# Patient Record
Sex: FEMALE | Race: WHITE | NOT HISPANIC OR LATINO | Employment: PART TIME | URBAN - NONMETROPOLITAN AREA
[De-identification: names, ages, dates, MRNs, and addresses within clinical notes are randomized per-mention and may not be internally consistent; named-entity substitution may affect disease eponyms.]

---

## 2017-11-25 ENCOUNTER — HOSPITAL ENCOUNTER (EMERGENCY)
Facility: HOSPITAL | Age: 42
Discharge: HOME | End: 2017-11-26
Attending: EMERGENCY MEDICINE | Admitting: EMERGENCY MEDICINE

## 2017-11-25 ENCOUNTER — APPOINTMENT (OUTPATIENT)
Dept: CT IMAGING | Facility: HOSPITAL | Age: 42
End: 2017-11-25

## 2017-11-25 DIAGNOSIS — N20.1 URETEROLITHIASIS: Primary | ICD-10-CM

## 2017-11-25 LAB
6-ACETYL MORPHINE: NEGATIVE
ALBUMIN SERPL-MCNC: 4.3 G/DL (ref 3.5–5)
ALBUMIN/GLOB SERPL: 1.7 G/DL (ref 1.5–2.5)
ALP SERPL-CCNC: 93 U/L (ref 35–104)
ALT SERPL W P-5'-P-CCNC: 17 U/L (ref 10–36)
AMPHET+METHAMPHET UR QL: NEGATIVE
ANION GAP SERPL CALCULATED.3IONS-SCNC: 5.4 MMOL/L (ref 3.6–11.2)
AST SERPL-CCNC: 21 U/L (ref 10–30)
B-HCG UR QL: NEGATIVE
BACTERIA UR QL AUTO: ABNORMAL /HPF
BARBITURATES UR QL SCN: NEGATIVE
BASOPHILS # BLD AUTO: 0.03 10*3/MM3 (ref 0–0.3)
BASOPHILS NFR BLD AUTO: 0.3 % (ref 0–2)
BENZODIAZ UR QL SCN: NEGATIVE
BILIRUB SERPL-MCNC: 0.3 MG/DL (ref 0.2–1.8)
BILIRUB UR QL STRIP: NEGATIVE
BUN BLD-MCNC: 13 MG/DL (ref 7–21)
BUN/CREAT SERPL: 16.5 (ref 7–25)
BUPRENORPHINE SERPL-MCNC: NEGATIVE NG/ML
CALCIUM SPEC-SCNC: 9 MG/DL (ref 7.7–10)
CANNABINOIDS SERPL QL: NEGATIVE
CHLORIDE SERPL-SCNC: 108 MMOL/L (ref 99–112)
CLARITY UR: CLEAR
CO2 SERPL-SCNC: 26.6 MMOL/L (ref 24.3–31.9)
COCAINE UR QL: NEGATIVE
COLOR UR: YELLOW
CREAT BLD-MCNC: 0.79 MG/DL (ref 0.43–1.29)
DEPRECATED RDW RBC AUTO: 38.8 FL (ref 37–54)
EOSINOPHIL # BLD AUTO: 0.15 10*3/MM3 (ref 0–0.7)
EOSINOPHIL NFR BLD AUTO: 1.7 % (ref 0–5)
ERYTHROCYTE [DISTWIDTH] IN BLOOD BY AUTOMATED COUNT: 11.5 % (ref 11.5–14.5)
GFR SERPL CREATININE-BSD FRML MDRD: 81 ML/MIN/1.73
GLOBULIN UR ELPH-MCNC: 2.5 GM/DL
GLUCOSE BLD-MCNC: 116 MG/DL (ref 70–110)
GLUCOSE UR STRIP-MCNC: NEGATIVE MG/DL
HCT VFR BLD AUTO: 36 % (ref 37–47)
HGB BLD-MCNC: 12 G/DL (ref 12–16)
HGB UR QL STRIP.AUTO: ABNORMAL
HYALINE CASTS UR QL AUTO: ABNORMAL /LPF
IMM GRANULOCYTES # BLD: 0.07 10*3/MM3 (ref 0–0.03)
IMM GRANULOCYTES NFR BLD: 0.8 % (ref 0–0.5)
KETONES UR QL STRIP: NEGATIVE
LEUKOCYTE ESTERASE UR QL STRIP.AUTO: ABNORMAL
LYMPHOCYTES # BLD AUTO: 1.85 10*3/MM3 (ref 1–3)
LYMPHOCYTES NFR BLD AUTO: 20.8 % (ref 21–51)
MCH RBC QN AUTO: 31.3 PG (ref 27–33)
MCHC RBC AUTO-ENTMCNC: 33.3 G/DL (ref 33–37)
MCV RBC AUTO: 93.8 FL (ref 80–94)
METHADONE UR QL SCN: NEGATIVE
MONOCYTES # BLD AUTO: 0.75 10*3/MM3 (ref 0.1–0.9)
MONOCYTES NFR BLD AUTO: 8.4 % (ref 0–10)
NEUTROPHILS # BLD AUTO: 6.06 10*3/MM3 (ref 1.4–6.5)
NEUTROPHILS NFR BLD AUTO: 68 % (ref 30–70)
NITRITE UR QL STRIP: NEGATIVE
OPIATES UR QL: NEGATIVE
OSMOLALITY SERPL CALC.SUM OF ELEC: 280.5 MOSM/KG (ref 273–305)
OXYCODONE UR QL SCN: NEGATIVE
PCP UR QL SCN: NEGATIVE
PH UR STRIP.AUTO: 6 [PH] (ref 5–8)
PLATELET # BLD AUTO: 300 10*3/MM3 (ref 130–400)
PMV BLD AUTO: 9.9 FL (ref 6–10)
POTASSIUM BLD-SCNC: 3.6 MMOL/L (ref 3.5–5.3)
PROT SERPL-MCNC: 6.8 G/DL (ref 6–8)
PROT UR QL STRIP: NEGATIVE
RBC # BLD AUTO: 3.84 10*6/MM3 (ref 4.2–5.4)
RBC # UR: ABNORMAL /HPF
REF LAB TEST METHOD: ABNORMAL
SODIUM BLD-SCNC: 140 MMOL/L (ref 135–153)
SP GR UR STRIP: 1.01 (ref 1–1.03)
SQUAMOUS #/AREA URNS HPF: ABNORMAL /HPF
UROBILINOGEN UR QL STRIP: ABNORMAL
WBC NRBC COR # BLD: 8.91 10*3/MM3 (ref 4.5–12.5)
WBC UR QL AUTO: ABNORMAL /HPF

## 2017-11-25 PROCEDURE — 80053 COMPREHEN METABOLIC PANEL: CPT | Performed by: EMERGENCY MEDICINE

## 2017-11-25 PROCEDURE — 80307 DRUG TEST PRSMV CHEM ANLYZR: CPT | Performed by: EMERGENCY MEDICINE

## 2017-11-25 PROCEDURE — 99283 EMERGENCY DEPT VISIT LOW MDM: CPT

## 2017-11-25 PROCEDURE — 25010000002 PROMETHAZINE PER 50 MG: Performed by: EMERGENCY MEDICINE

## 2017-11-25 PROCEDURE — 81001 URINALYSIS AUTO W/SCOPE: CPT | Performed by: EMERGENCY MEDICINE

## 2017-11-25 PROCEDURE — 74176 CT ABD & PELVIS W/O CONTRAST: CPT | Performed by: RADIOLOGY

## 2017-11-25 PROCEDURE — 85025 COMPLETE CBC W/AUTO DIFF WBC: CPT | Performed by: EMERGENCY MEDICINE

## 2017-11-25 PROCEDURE — 87086 URINE CULTURE/COLONY COUNT: CPT | Performed by: EMERGENCY MEDICINE

## 2017-11-25 PROCEDURE — 74176 CT ABD & PELVIS W/O CONTRAST: CPT

## 2017-11-25 PROCEDURE — 96365 THER/PROPH/DIAG IV INF INIT: CPT

## 2017-11-25 PROCEDURE — 81025 URINE PREGNANCY TEST: CPT | Performed by: EMERGENCY MEDICINE

## 2017-11-25 PROCEDURE — 96361 HYDRATE IV INFUSION ADD-ON: CPT

## 2017-11-25 RX ORDER — TAMSULOSIN HYDROCHLORIDE 0.4 MG/1
1 CAPSULE ORAL 2 TIMES DAILY
Qty: 10 CAPSULE | Refills: 0 | Status: SHIPPED | OUTPATIENT
Start: 2017-11-25 | End: 2019-12-11

## 2017-11-25 RX ORDER — SODIUM CHLORIDE 9 MG/ML
125 INJECTION, SOLUTION INTRAVENOUS CONTINUOUS
Status: DISCONTINUED | OUTPATIENT
Start: 2017-11-25 | End: 2017-11-26 | Stop reason: HOSPADM

## 2017-11-25 RX ORDER — SODIUM CHLORIDE 0.9 % (FLUSH) 0.9 %
10 SYRINGE (ML) INJECTION AS NEEDED
Status: DISCONTINUED | OUTPATIENT
Start: 2017-11-25 | End: 2017-11-26 | Stop reason: HOSPADM

## 2017-11-25 RX ORDER — FLUOXETINE HYDROCHLORIDE 20 MG/1
20 CAPSULE ORAL DAILY
COMMUNITY
End: 2019-12-11 | Stop reason: SDUPTHER

## 2017-11-25 RX ORDER — LORATADINE 10 MG/1
10 CAPSULE, LIQUID FILLED ORAL DAILY
COMMUNITY
End: 2018-10-24

## 2017-11-25 RX ORDER — ONDANSETRON 4 MG/1
4 TABLET, ORALLY DISINTEGRATING ORAL 4 TIMES DAILY
Qty: 15 TABLET | Refills: 0 | Status: SHIPPED | OUTPATIENT
Start: 2017-11-25 | End: 2019-12-11

## 2017-11-25 RX ORDER — HYDROCODONE BITARTRATE AND ACETAMINOPHEN 5; 325 MG/1; MG/1
1 TABLET ORAL EVERY 6 HOURS PRN
Qty: 12 TABLET | Refills: 0 | Status: SHIPPED | OUTPATIENT
Start: 2017-11-25 | End: 2019-12-11

## 2017-11-25 RX ADMIN — SODIUM CHLORIDE 125 ML/HR: 9 INJECTION, SOLUTION INTRAVENOUS at 22:32

## 2017-11-25 RX ADMIN — SODIUM CHLORIDE 1000 ML: 9 INJECTION, SOLUTION INTRAVENOUS at 22:32

## 2017-11-25 RX ADMIN — PROMETHAZINE HYDROCHLORIDE 12.5 MG: 25 INJECTION INTRAMUSCULAR; INTRAVENOUS at 23:34

## 2017-11-26 VITALS
SYSTOLIC BLOOD PRESSURE: 136 MMHG | DIASTOLIC BLOOD PRESSURE: 89 MMHG | HEART RATE: 79 BPM | OXYGEN SATURATION: 99 % | HEIGHT: 60 IN | BODY MASS INDEX: 24.15 KG/M2 | WEIGHT: 123 LBS | TEMPERATURE: 98 F | RESPIRATION RATE: 16 BRPM

## 2017-11-26 NOTE — ED PROVIDER NOTES
Subjective   HPI Comments: Pt comes in with concern for flank pain and possible hematuria.  Pain started on right, then moved to left, and suprapubic.  Nausea.  No vomiting.  Pt also has diarrhea today.  Pt also started menstrual cycle today.    Patient is a 42 y.o. female presenting with flank pain.   History provided by:  Patient  Flank Pain   Pain location:  R flank and L flank  Pain quality: aching, cramping and dull    Pain quality: not bloating, not burning, no fullness, not gnawing, not heavy, no pressure, not sharp, not shooting, not squeezing, not stabbing, no stiffness, not tearing, not throbbing and not tugging    Pain radiates to:  Does not radiate  Pain severity:  Moderate  Onset quality:  Unable to specify  Duration: Upon awakening this morning.  Timing:  Constant  Progression:  Waxing and waning  Chronicity:  Recurrent  Context: not alcohol use, not awakening from sleep, not diet changes, not eating, not laxative use, not medication withdrawal, not previous surgeries, not recent illness, not recent sexual activity, not recent travel, not retching, not sick contacts, not suspicious food intake and not trauma    Relieved by:  Nothing  Worsened by:  Nothing  Ineffective treatments:  None tried  Associated symptoms: chills, diarrhea, hematuria, nausea and vaginal bleeding    Associated symptoms: no anorexia, no belching, no chest pain, no constipation, no cough, no dysuria, no fatigue, no fever, no flatus, no hematemesis, no hematochezia, no melena, no shortness of breath, no sore throat, no vaginal discharge and no vomiting    Risk factors: no alcohol abuse, no aspirin use, not elderly, has not had multiple surgeries, no NSAID use, not obese, not pregnant and no recent hospitalization        Review of Systems   Constitutional: Positive for chills. Negative for fatigue and fever.   HENT: Negative.  Negative for sore throat.    Eyes: Negative.    Respiratory: Negative.  Negative for cough and shortness of  breath.    Cardiovascular: Negative.  Negative for chest pain.   Gastrointestinal: Positive for diarrhea and nausea. Negative for abdominal pain, anorexia, constipation, flatus, hematemesis, hematochezia, melena and vomiting.   Endocrine: Negative.    Genitourinary: Positive for flank pain, hematuria and vaginal bleeding. Negative for dysuria and vaginal discharge.   Musculoskeletal: Positive for back pain.   Skin: Negative.    Allergic/Immunologic: Negative.    Neurological: Negative.    Hematological: Negative.    Psychiatric/Behavioral: Negative.    All other systems reviewed and are negative.      Past Medical History:   Diagnosis Date   • Depression    • GERD (gastroesophageal reflux disease)    • Kidney stone        No Known Allergies    History reviewed. No pertinent surgical history.    History reviewed. No pertinent family history.    Social History     Social History   • Marital status:      Spouse name: N/A   • Number of children: N/A   • Years of education: N/A     Social History Main Topics   • Smoking status: Never Smoker   • Smokeless tobacco: Current User     Types: Snuff   • Alcohol use None      Comment: OCASSIONAL   • Drug use: No   • Sexual activity: Not Asked     Other Topics Concern   • None     Social History Narrative   • None           Objective   Physical Exam   Constitutional: She is oriented to person, place, and time. She appears well-developed and well-nourished. No distress.   HENT:   Head: Normocephalic and atraumatic.   Nose: Nose normal.   Moist mucus membranes   Eyes: Conjunctivae and EOM are normal. Right eye exhibits no discharge. Left eye exhibits no discharge. No scleral icterus.   Neck: Normal range of motion. No JVD present. No tracheal deviation present. No thyromegaly present.   Cardiovascular: Normal rate, regular rhythm, normal heart sounds and intact distal pulses.  Exam reveals no gallop and no friction rub.    No murmur heard.  Pulmonary/Chest: Effort normal  and breath sounds normal. No stridor. No respiratory distress. She has no wheezes. She has no rales. She exhibits no tenderness.   Abdominal: Soft. Bowel sounds are normal. She exhibits no distension and no mass. There is no tenderness. There is no guarding.   Genitourinary:   Genitourinary Comments: No CVAT   Musculoskeletal: Normal range of motion. She exhibits no tenderness or deformity.   Neurological: She is alert and oriented to person, place, and time. She exhibits normal muscle tone. Coordination normal.   Skin: Skin is warm and dry. No pallor.   Psychiatric: She has a normal mood and affect. Her behavior is normal. Judgment and thought content normal.   Nursing note and vitals reviewed.      Procedures         ED Course  ED Course   Comment By Time   Ning Gaines report [id number] requested received and reviewed.  0 active cumulative morphine equivalent.  No concerning entries Cortez Loza MD 11/25 7711     CT Abdomen Pelvis Stone Protocol   ED Interpretation   CT abdomen and pelvis without IV contrast, renal stone protocol   Faxed report from [facility]   Abdomen:   Kidneys and ureters: Mild right hydronephrosis related to a 7 mm   [facility] stone.   Impression: Mild right hydronephrosis related to a 7 mm [facility]   stone.   Signed Tenzin Call M.D.        Labs Reviewed   COMPREHENSIVE METABOLIC PANEL - Abnormal; Notable for the following:        Result Value    Glucose 116 (*)     All other components within normal limits   URINALYSIS W/ CULTURE IF INDICATED - Abnormal; Notable for the following:     Blood, UA Large (3+) (*)     Leuk Esterase, UA Small (1+) (*)     All other components within normal limits   CBC WITH AUTO DIFFERENTIAL - Abnormal; Notable for the following:     RBC 3.84 (*)     Hematocrit 36.0 (*)     Lymphocyte % 20.8 (*)     Immature Grans % 0.8 (*)     Immature Grans, Absolute 0.07 (*)     All other components within normal limits   URINALYSIS, MICROSCOPIC ONLY - Abnormal;  Notable for the following:     RBC, UA Too Numerous to Count (*)     WBC, UA 3-6 (*)     All other components within normal limits   PREGNANCY, URINE - Normal    Narrative:     Diluted specimens may cause false negative results.   URINE DRUG SCREEN - Normal    Narrative:     Negative Thresholds For Drugs Screened:                  Amphetamines              1000 ng/ml               Barbiturates               200 ng/ml               Benzodiazepines            200 ng/ml              Cocaine                    300 ng/ml              Methadone                  300 ng/ml              Opiates                    300 ng/ml               Phencyclidine               25 ng/ml               THC                         50 ng/ml              6-Acetyl Morphine           10 ng/ml              Buprenorphine                5 ng/ml              Oxycodone                  300 ng/ml    The reference range for all drugs tested is negative. This report includes final unconfirmed qualitative results to be used for medical treatment purposes only. Unconfirmed results must not be used for non-medical purposes such as employment or legal testing. Clinical consideration should be applied to any drug of abuse test, especially when unconfirmed quantitative results are used.     OSMOLALITY, CALCULATED - Normal   URINE CULTURE   CBC AND DIFFERENTIAL    Narrative:     The following orders were created for panel order CBC & Differential.  Procedure                               Abnormality         Status                     ---------                               -----------         ------                     CBC Auto Differential[[id number]]         Abnormal            Final result                 Please view results for these tests on the individual orders.        Medication List      START taking these medications          HYDROcodone-acetaminophen 5-325 MG per tablet   Commonly known as:  NORCO   Take 1 tablet by mouth Every 6 (Six) Hours As Needed  for Moderate Pain    for up to 12 doses.       ondansetron ODT 4 MG disintegrating tablet   Commonly known as:  ZOFRAN-ODT   Take 1 tablet by mouth 4 (Four) Times a Day.       tamsulosin 0.4 MG capsule 24 hr capsule   Commonly known as:  FLOMAX   Take 1 capsule by mouth 2 (Two) Times a Day.         CONTINUE taking these medications          CLARITIN 10 MG capsule   Generic drug:  Loratadine       FLUoxetine 20 MG capsule   Commonly known as:  PROzac       RANITIDINE 75 PO                  MDM  Number of Diagnoses or Management Options  Ureterolithiasis: new and requires workup     Amount and/or Complexity of Data Reviewed  Clinical lab tests: reviewed and ordered  Tests in the radiology section of CPT®: reviewed and ordered  Obtain history from someone other than the patient: yes  Independent visualization of images, tracings, or specimens: yes    Risk of Complications, Morbidity, and/or Mortality  Presenting problems: moderate  Diagnostic procedures: high  Management options: moderate    Patient Progress  Patient progress: stable      Final diagnoses:   Ureterolithiasis            Cortez Loza MD  11/25/17 0981

## 2017-11-28 LAB
BACTERIA SPEC AEROBE CULT: NORMAL
BACTERIA SPEC AEROBE CULT: NORMAL

## 2017-12-11 ENCOUNTER — HOSPITAL ENCOUNTER (EMERGENCY)
Facility: HOSPITAL | Age: 42
Discharge: HOME | End: 2017-12-12
Attending: EMERGENCY MEDICINE | Admitting: EMERGENCY MEDICINE

## 2017-12-11 DIAGNOSIS — N39.0 ACUTE UTI: Primary | ICD-10-CM

## 2017-12-11 LAB
ALBUMIN SERPL-MCNC: 4.3 G/DL (ref 3.5–5)
ALBUMIN/GLOB SERPL: 1.7 G/DL (ref 1.5–2.5)
ALP SERPL-CCNC: 65 U/L (ref 35–104)
ALT SERPL W P-5'-P-CCNC: 18 U/L (ref 10–36)
ANION GAP SERPL CALCULATED.3IONS-SCNC: 6.9 MMOL/L (ref 3.6–11.2)
AST SERPL-CCNC: 17 U/L (ref 10–30)
B-HCG UR QL: NEGATIVE
BACTERIA UR QL AUTO: ABNORMAL /HPF
BASOPHILS # BLD AUTO: 0.04 10*3/MM3 (ref 0–0.3)
BASOPHILS NFR BLD AUTO: 0.3 % (ref 0–2)
BILIRUB SERPL-MCNC: 0.3 MG/DL (ref 0.2–1.8)
BILIRUB UR QL STRIP: NEGATIVE
BUN BLD-MCNC: 10 MG/DL (ref 7–21)
BUN/CREAT SERPL: 12.8 (ref 7–25)
CALCIUM SPEC-SCNC: 8.9 MG/DL (ref 7.7–10)
CHLORIDE SERPL-SCNC: 107 MMOL/L (ref 99–112)
CLARITY UR: CLEAR
CO2 SERPL-SCNC: 26.1 MMOL/L (ref 24.3–31.9)
COLOR UR: ABNORMAL
CREAT BLD-MCNC: 0.78 MG/DL (ref 0.43–1.29)
DEPRECATED RDW RBC AUTO: 40.1 FL (ref 37–54)
EOSINOPHIL # BLD AUTO: 0.18 10*3/MM3 (ref 0–0.7)
EOSINOPHIL NFR BLD AUTO: 1.4 % (ref 0–5)
ERYTHROCYTE [DISTWIDTH] IN BLOOD BY AUTOMATED COUNT: 12 % (ref 11.5–14.5)
GFR SERPL CREATININE-BSD FRML MDRD: 82 ML/MIN/1.73
GLOBULIN UR ELPH-MCNC: 2.5 GM/DL
GLUCOSE BLD-MCNC: 98 MG/DL (ref 70–110)
GLUCOSE UR STRIP-MCNC: NEGATIVE MG/DL
HCT VFR BLD AUTO: 36.7 % (ref 37–47)
HGB BLD-MCNC: 12.3 G/DL (ref 12–16)
HGB UR QL STRIP.AUTO: ABNORMAL
HYALINE CASTS UR QL AUTO: ABNORMAL /LPF
IMM GRANULOCYTES # BLD: 0.09 10*3/MM3 (ref 0–0.03)
IMM GRANULOCYTES NFR BLD: 0.7 % (ref 0–0.5)
KETONES UR QL STRIP: NEGATIVE
LEUKOCYTE ESTERASE UR QL STRIP.AUTO: ABNORMAL
LYMPHOCYTES # BLD AUTO: 2.14 10*3/MM3 (ref 1–3)
LYMPHOCYTES NFR BLD AUTO: 17.2 % (ref 21–51)
MCH RBC QN AUTO: 31.4 PG (ref 27–33)
MCHC RBC AUTO-ENTMCNC: 33.5 G/DL (ref 33–37)
MCV RBC AUTO: 93.6 FL (ref 80–94)
MONOCYTES # BLD AUTO: 0.95 10*3/MM3 (ref 0.1–0.9)
MONOCYTES NFR BLD AUTO: 7.6 % (ref 0–10)
NEUTROPHILS # BLD AUTO: 9.04 10*3/MM3 (ref 1.4–6.5)
NEUTROPHILS NFR BLD AUTO: 72.8 % (ref 30–70)
NITRITE UR QL STRIP: POSITIVE
OSMOLALITY SERPL CALC.SUM OF ELEC: 278.4 MOSM/KG (ref 273–305)
PH UR STRIP.AUTO: 7 [PH] (ref 5–8)
PLATELET # BLD AUTO: 358 10*3/MM3 (ref 130–400)
PMV BLD AUTO: 9.7 FL (ref 6–10)
POTASSIUM BLD-SCNC: 4.1 MMOL/L (ref 3.5–5.3)
PROT SERPL-MCNC: 6.8 G/DL (ref 6–8)
PROT UR QL STRIP: NEGATIVE
RBC # BLD AUTO: 3.92 10*6/MM3 (ref 4.2–5.4)
RBC # UR: ABNORMAL /HPF
REF LAB TEST METHOD: ABNORMAL
SODIUM BLD-SCNC: 140 MMOL/L (ref 135–153)
SP GR UR STRIP: 1.02 (ref 1–1.03)
SQUAMOUS #/AREA URNS HPF: ABNORMAL /HPF
UROBILINOGEN UR QL STRIP: ABNORMAL
WBC NRBC COR # BLD: 12.44 10*3/MM3 (ref 4.5–12.5)
WBC UR QL AUTO: ABNORMAL /HPF

## 2017-12-11 PROCEDURE — 81025 URINE PREGNANCY TEST: CPT | Performed by: EMERGENCY MEDICINE

## 2017-12-11 PROCEDURE — 25010000002 KETOROLAC TROMETHAMINE PER 15 MG: Performed by: EMERGENCY MEDICINE

## 2017-12-11 PROCEDURE — 87147 CULTURE TYPE IMMUNOLOGIC: CPT | Performed by: EMERGENCY MEDICINE

## 2017-12-11 PROCEDURE — 87077 CULTURE AEROBIC IDENTIFY: CPT | Performed by: EMERGENCY MEDICINE

## 2017-12-11 PROCEDURE — 96375 TX/PRO/DX INJ NEW DRUG ADDON: CPT

## 2017-12-11 PROCEDURE — 99283 EMERGENCY DEPT VISIT LOW MDM: CPT

## 2017-12-11 PROCEDURE — 87086 URINE CULTURE/COLONY COUNT: CPT | Performed by: EMERGENCY MEDICINE

## 2017-12-11 PROCEDURE — 87186 SC STD MICRODIL/AGAR DIL: CPT | Performed by: EMERGENCY MEDICINE

## 2017-12-11 PROCEDURE — 80053 COMPREHEN METABOLIC PANEL: CPT | Performed by: EMERGENCY MEDICINE

## 2017-12-11 PROCEDURE — 25010000002 CEFTRIAXONE: Performed by: EMERGENCY MEDICINE

## 2017-12-11 PROCEDURE — 85025 COMPLETE CBC W/AUTO DIFF WBC: CPT | Performed by: EMERGENCY MEDICINE

## 2017-12-11 PROCEDURE — 81001 URINALYSIS AUTO W/SCOPE: CPT | Performed by: EMERGENCY MEDICINE

## 2017-12-11 PROCEDURE — 96365 THER/PROPH/DIAG IV INF INIT: CPT

## 2017-12-11 RX ORDER — SODIUM CHLORIDE 0.9 % (FLUSH) 0.9 %
10 SYRINGE (ML) INJECTION AS NEEDED
Status: DISCONTINUED | OUTPATIENT
Start: 2017-12-11 | End: 2017-12-12 | Stop reason: HOSPADM

## 2017-12-11 RX ORDER — SULFAMETHOXAZOLE AND TRIMETHOPRIM 800; 160 MG/1; MG/1
1 TABLET ORAL 2 TIMES DAILY
Qty: 12 TABLET | Refills: 0 | Status: SHIPPED | OUTPATIENT
Start: 2017-12-11 | End: 2019-12-11

## 2017-12-11 RX ORDER — KETOROLAC TROMETHAMINE 30 MG/ML
30 INJECTION, SOLUTION INTRAMUSCULAR; INTRAVENOUS ONCE
Status: COMPLETED | OUTPATIENT
Start: 2017-12-11 | End: 2017-12-11

## 2017-12-11 RX ORDER — KETOROLAC TROMETHAMINE 10 MG/1
10 TABLET, FILM COATED ORAL EVERY 6 HOURS PRN
Qty: 12 TABLET | Refills: 0 | Status: SHIPPED | OUTPATIENT
Start: 2017-12-11 | End: 2019-05-31 | Stop reason: HOSPADM

## 2017-12-11 RX ADMIN — CEFTRIAXONE 1 G: 1 INJECTION, POWDER, FOR SOLUTION INTRAMUSCULAR; INTRAVENOUS at 23:48

## 2017-12-11 RX ADMIN — KETOROLAC TROMETHAMINE 30 MG: 30 INJECTION, SOLUTION INTRAMUSCULAR at 22:48

## 2017-12-11 RX ADMIN — SODIUM CHLORIDE 1000 ML: 9 INJECTION, SOLUTION INTRAVENOUS at 23:46

## 2017-12-12 VITALS
BODY MASS INDEX: 24.54 KG/M2 | OXYGEN SATURATION: 99 % | RESPIRATION RATE: 18 BRPM | WEIGHT: 125 LBS | TEMPERATURE: 98.2 F | DIASTOLIC BLOOD PRESSURE: 78 MMHG | HEIGHT: 60 IN | SYSTOLIC BLOOD PRESSURE: 105 MMHG | HEART RATE: 71 BPM

## 2017-12-12 RX ORDER — ONDANSETRON 4 MG/1
TABLET, ORALLY DISINTEGRATING ORAL
Status: DISCONTINUED
Start: 2017-12-12 | End: 2017-12-12 | Stop reason: HOSPADM

## 2017-12-12 RX ORDER — ONDANSETRON 4 MG/1
4 TABLET, ORALLY DISINTEGRATING ORAL ONCE
Status: COMPLETED | OUTPATIENT
Start: 2017-12-12 | End: 2017-12-12

## 2017-12-12 RX ADMIN — ONDANSETRON 4 MG: 4 TABLET, ORALLY DISINTEGRATING ORAL at 00:53

## 2017-12-12 NOTE — ED PROVIDER NOTES
Subjective   Patient is a 42 y.o. female presenting with female genitourinary complaint.   Female  Problem   Primary symptoms include dysuria. This is a recurrent problem. The problem has not changed since onset.Context: had ureteral stone at [facility]. She is not pregnant. Associated symptoms include nausea and frequency. Pertinent negatives include no abdominal pain.       Review of Systems   Constitutional: Negative.  Negative for fever.   HENT: Negative.    Respiratory: Negative.    Cardiovascular: Negative.  Negative for chest pain.   Gastrointestinal: Positive for nausea. Negative for abdominal pain.   Endocrine: Negative.    Genitourinary: Positive for dysuria, frequency and hematuria.   Skin: Negative.    Neurological: Negative.    Psychiatric/Behavioral: Negative.    All other systems reviewed and are negative.      Past Medical History:   Diagnosis Date   • Depression    • GERD (gastroesophageal reflux disease)    • Kidney stone        No Known Allergies    History reviewed. No pertinent surgical history.    History reviewed. No pertinent family history.    Social History     Social History   • Marital status:      Spouse name: N/A   • Number of children: N/A   • Years of education: N/A     Social History Main Topics   • Smoking status: Never Smoker   • Smokeless tobacco: Current User     Types: Snuff   • Alcohol use None      Comment: OCASSIONAL   • Drug use: No   • Sexual activity: Yes     Other Topics Concern   • None     Social History Narrative   • None           Objective   Physical Exam   Constitutional: She is oriented to person, place, and time. She appears well-developed and well-nourished. No distress.   HENT:   Head: Normocephalic and atraumatic.   Right Ear: External ear normal.   Left Ear: External ear normal.   Nose: Nose normal.   Eyes: Conjunctivae and EOM are normal. Pupils are equal, round, and reactive to light.   Neck: Normal range of motion. Neck supple. No JVD present. No tracheal  deviation present.   Cardiovascular: Normal rate, regular rhythm and normal heart sounds.    No murmur heard.  Pulmonary/Chest: Effort normal and breath sounds normal. No respiratory distress. She has no wheezes.   Abdominal: Soft. Bowel sounds are normal. There is no tenderness.   Mild suprapubic tenderness   Musculoskeletal: Normal range of motion. She exhibits no edema or deformity.   Neurological: She is alert and oriented to person, place, and time. No cranial nerve deficit.   Skin: Skin is warm and dry. No rash noted. She is not diaphoretic. No erythema. No pallor.   Psychiatric: She has a normal mood and affect. Her behavior is normal. Thought content normal.   Nursing note and vitals reviewed.      Procedures         ED Course  ED Course                  MDM    Final diagnoses:   Acute UTI            Ramirez Tracey MD  12/12/17 0001

## 2017-12-12 NOTE — ED NOTES
Provider in room at this time discussing results and plan to discharge home at this time, all questions and concerns addressed, pt and family verbalize understanding.     Patricia Valencia RN  12/12/17 0117

## 2017-12-12 NOTE — ED NOTES
Pt requests Zofran to take home for tonight, provider made aware, new order received.     Patricia Valencia RN  12/12/17 0123

## 2017-12-14 LAB
BACTERIA SPEC AEROBE CULT: ABNORMAL
BACTERIA SPEC AEROBE CULT: ABNORMAL

## 2018-08-30 ENCOUNTER — APPOINTMENT (OUTPATIENT)
Dept: CT IMAGING | Facility: HOSPITAL | Age: 43
End: 2018-08-30

## 2018-08-30 ENCOUNTER — HOSPITAL ENCOUNTER (EMERGENCY)
Facility: HOSPITAL | Age: 43
Discharge: HOME | End: 2018-08-30
Attending: EMERGENCY MEDICINE | Admitting: EMERGENCY MEDICINE

## 2018-08-30 VITALS
SYSTOLIC BLOOD PRESSURE: 118 MMHG | DIASTOLIC BLOOD PRESSURE: 82 MMHG | HEIGHT: 60 IN | WEIGHT: 123 LBS | TEMPERATURE: 98.8 F | RESPIRATION RATE: 18 BRPM | BODY MASS INDEX: 24.15 KG/M2 | OXYGEN SATURATION: 100 % | HEART RATE: 69 BPM

## 2018-08-30 DIAGNOSIS — N23 RENAL COLIC ON RIGHT SIDE: Primary | ICD-10-CM

## 2018-08-30 LAB
ALBUMIN SERPL-MCNC: 4.4 G/DL (ref 3.5–5)
ALBUMIN/GLOB SERPL: 1.8 G/DL (ref 1.5–2.5)
ALP SERPL-CCNC: 63 U/L (ref 35–104)
ALT SERPL W P-5'-P-CCNC: 23 U/L (ref 10–36)
ANION GAP SERPL CALCULATED.3IONS-SCNC: 8.1 MMOL/L (ref 3.6–11.2)
AST SERPL-CCNC: 19 U/L (ref 10–30)
B-HCG UR QL: NEGATIVE
BACTERIA UR QL AUTO: ABNORMAL /HPF
BASOPHILS # BLD AUTO: 0.03 10*3/MM3 (ref 0–0.3)
BASOPHILS NFR BLD AUTO: 0.3 % (ref 0–2)
BILIRUB SERPL-MCNC: 0.6 MG/DL (ref 0.2–1.8)
BILIRUB UR QL STRIP: NEGATIVE
BUN BLD-MCNC: 16 MG/DL (ref 7–21)
BUN/CREAT SERPL: 21.3 (ref 7–25)
CALCIUM SPEC-SCNC: 9.2 MG/DL (ref 7.7–10)
CHLORIDE SERPL-SCNC: 108 MMOL/L (ref 99–112)
CLARITY UR: CLEAR
CO2 SERPL-SCNC: 23.9 MMOL/L (ref 24.3–31.9)
COLOR UR: YELLOW
CREAT BLD-MCNC: 0.75 MG/DL (ref 0.43–1.29)
DEPRECATED RDW RBC AUTO: 39.3 FL (ref 37–54)
EOSINOPHIL # BLD AUTO: 0.02 10*3/MM3 (ref 0–0.7)
EOSINOPHIL NFR BLD AUTO: 0.2 % (ref 0–5)
ERYTHROCYTE [DISTWIDTH] IN BLOOD BY AUTOMATED COUNT: 12.1 % (ref 11.5–14.5)
GFR SERPL CREATININE-BSD FRML MDRD: 86 ML/MIN/1.73
GLOBULIN UR ELPH-MCNC: 2.5 GM/DL
GLUCOSE BLD-MCNC: 83 MG/DL (ref 70–110)
GLUCOSE UR STRIP-MCNC: NEGATIVE MG/DL
HCT VFR BLD AUTO: 35 % (ref 37–47)
HGB BLD-MCNC: 12.1 G/DL (ref 12–16)
HGB UR QL STRIP.AUTO: ABNORMAL
HYALINE CASTS UR QL AUTO: ABNORMAL /LPF
IMM GRANULOCYTES # BLD: 0.09 10*3/MM3 (ref 0–0.03)
IMM GRANULOCYTES NFR BLD: 1 % (ref 0–0.5)
KETONES UR QL STRIP: ABNORMAL
LEUKOCYTE ESTERASE UR QL STRIP.AUTO: ABNORMAL
LYMPHOCYTES # BLD AUTO: 1.71 10*3/MM3 (ref 1–3)
LYMPHOCYTES NFR BLD AUTO: 19.2 % (ref 21–51)
MCH RBC QN AUTO: 31.6 PG (ref 27–33)
MCHC RBC AUTO-ENTMCNC: 34.6 G/DL (ref 33–37)
MCV RBC AUTO: 91.4 FL (ref 80–94)
MONOCYTES # BLD AUTO: 0.6 10*3/MM3 (ref 0.1–0.9)
MONOCYTES NFR BLD AUTO: 6.7 % (ref 0–10)
NEUTROPHILS # BLD AUTO: 6.45 10*3/MM3 (ref 1.4–6.5)
NEUTROPHILS NFR BLD AUTO: 72.6 % (ref 30–70)
NITRITE UR QL STRIP: NEGATIVE
OSMOLALITY SERPL CALC.SUM OF ELEC: 279.7 MOSM/KG (ref 273–305)
PH UR STRIP.AUTO: <=5 [PH] (ref 5–8)
PLATELET # BLD AUTO: 283 10*3/MM3 (ref 130–400)
PMV BLD AUTO: 10.3 FL (ref 6–10)
POTASSIUM BLD-SCNC: 3.8 MMOL/L (ref 3.5–5.3)
PROT SERPL-MCNC: 6.9 G/DL (ref 6–8)
PROT UR QL STRIP: NEGATIVE
RBC # BLD AUTO: 3.83 10*6/MM3 (ref 4.2–5.4)
RBC # UR: ABNORMAL /HPF
REF LAB TEST METHOD: ABNORMAL
SODIUM BLD-SCNC: 140 MMOL/L (ref 135–153)
SP GR UR STRIP: 1.03 (ref 1–1.03)
SQUAMOUS #/AREA URNS HPF: ABNORMAL /HPF
UROBILINOGEN UR QL STRIP: ABNORMAL
WBC NRBC COR # BLD: 8.9 10*3/MM3 (ref 4.5–12.5)
WBC UR QL AUTO: ABNORMAL /HPF

## 2018-08-30 PROCEDURE — 74176 CT ABD & PELVIS W/O CONTRAST: CPT

## 2018-08-30 PROCEDURE — 96361 HYDRATE IV INFUSION ADD-ON: CPT

## 2018-08-30 PROCEDURE — 99284 EMERGENCY DEPT VISIT MOD MDM: CPT

## 2018-08-30 PROCEDURE — 85025 COMPLETE CBC W/AUTO DIFF WBC: CPT | Performed by: EMERGENCY MEDICINE

## 2018-08-30 PROCEDURE — 81001 URINALYSIS AUTO W/SCOPE: CPT | Performed by: EMERGENCY MEDICINE

## 2018-08-30 PROCEDURE — 74176 CT ABD & PELVIS W/O CONTRAST: CPT | Performed by: RADIOLOGY

## 2018-08-30 PROCEDURE — 25010000002 ONDANSETRON PER 1 MG: Performed by: EMERGENCY MEDICINE

## 2018-08-30 PROCEDURE — 80053 COMPREHEN METABOLIC PANEL: CPT | Performed by: EMERGENCY MEDICINE

## 2018-08-30 PROCEDURE — 25010000002 KETOROLAC TROMETHAMINE PER 15 MG: Performed by: EMERGENCY MEDICINE

## 2018-08-30 PROCEDURE — 96374 THER/PROPH/DIAG INJ IV PUSH: CPT

## 2018-08-30 PROCEDURE — 96375 TX/PRO/DX INJ NEW DRUG ADDON: CPT

## 2018-08-30 PROCEDURE — 87086 URINE CULTURE/COLONY COUNT: CPT | Performed by: EMERGENCY MEDICINE

## 2018-08-30 PROCEDURE — 25010000002 MORPHINE SULFATE (PF) 2 MG/ML SOLUTION: Performed by: EMERGENCY MEDICINE

## 2018-08-30 PROCEDURE — 81025 URINE PREGNANCY TEST: CPT | Performed by: EMERGENCY MEDICINE

## 2018-08-30 RX ORDER — MORPHINE SULFATE 2 MG/ML
4 INJECTION, SOLUTION INTRAMUSCULAR; INTRAVENOUS ONCE
Status: COMPLETED | OUTPATIENT
Start: 2018-08-30 | End: 2018-08-30

## 2018-08-30 RX ORDER — ONDANSETRON 4 MG/1
4 TABLET, ORALLY DISINTEGRATING ORAL EVERY 12 HOURS PRN
Status: DISCONTINUED | OUTPATIENT
Start: 2018-08-30 | End: 2018-08-30 | Stop reason: HOSPADM

## 2018-08-30 RX ORDER — KETOROLAC TROMETHAMINE 10 MG/1
10 TABLET, FILM COATED ORAL EVERY 8 HOURS PRN
Status: DISCONTINUED | OUTPATIENT
Start: 2018-08-30 | End: 2018-08-30 | Stop reason: HOSPADM

## 2018-08-30 RX ORDER — KETOROLAC TROMETHAMINE 30 MG/ML
30 INJECTION, SOLUTION INTRAMUSCULAR; INTRAVENOUS ONCE
Status: COMPLETED | OUTPATIENT
Start: 2018-08-30 | End: 2018-08-30

## 2018-08-30 RX ORDER — ONDANSETRON 2 MG/ML
4 INJECTION INTRAMUSCULAR; INTRAVENOUS ONCE
Status: COMPLETED | OUTPATIENT
Start: 2018-08-30 | End: 2018-08-30

## 2018-08-30 RX ORDER — SODIUM CHLORIDE 0.9 % (FLUSH) 0.9 %
10 SYRINGE (ML) INJECTION AS NEEDED
Status: DISCONTINUED | OUTPATIENT
Start: 2018-08-30 | End: 2018-08-30 | Stop reason: HOSPADM

## 2018-08-30 RX ADMIN — ONDANSETRON 4 MG: 4 TABLET, ORALLY DISINTEGRATING ORAL at 19:05

## 2018-08-30 RX ADMIN — KETOROLAC TROMETHAMINE 10 MG: 10 TABLET, FILM COATED ORAL at 19:04

## 2018-08-30 RX ADMIN — SODIUM CHLORIDE 1000 ML: 9 INJECTION, SOLUTION INTRAVENOUS at 16:29

## 2018-08-30 RX ADMIN — KETOROLAC TROMETHAMINE 30 MG: 30 INJECTION, SOLUTION INTRAMUSCULAR; INTRAVENOUS at 16:33

## 2018-08-30 RX ADMIN — ONDANSETRON 4 MG: 2 INJECTION, SOLUTION INTRAMUSCULAR; INTRAVENOUS at 16:30

## 2018-08-30 RX ADMIN — MORPHINE SULFATE 4 MG: 2 INJECTION, SOLUTION INTRAMUSCULAR; INTRAVENOUS at 16:35

## 2018-09-01 LAB — BACTERIA SPEC AEROBE CULT: NORMAL

## 2018-09-05 ENCOUNTER — TRANSCRIBE ORDERS (OUTPATIENT)
Dept: GENERAL RADIOLOGY | Facility: HOSPITAL | Age: 43
End: 2018-09-05

## 2018-09-05 ENCOUNTER — HOSPITAL ENCOUNTER (OUTPATIENT)
Dept: GENERAL RADIOLOGY | Facility: HOSPITAL | Age: 43
Discharge: HOME | End: 2018-09-05
Admitting: NURSE PRACTITIONER

## 2018-09-05 DIAGNOSIS — R91.1 LUNG NODULE: Primary | ICD-10-CM

## 2018-09-05 DIAGNOSIS — R91.1 LUNG NODULE: ICD-10-CM

## 2018-09-05 PROCEDURE — 71046 X-RAY EXAM CHEST 2 VIEWS: CPT | Performed by: RADIOLOGY

## 2018-09-05 PROCEDURE — 71046 X-RAY EXAM CHEST 2 VIEWS: CPT

## 2018-09-17 ENCOUNTER — TRANSCRIBE ORDERS (OUTPATIENT)
Dept: ADMINISTRATIVE | Facility: HOSPITAL | Age: 43
End: 2018-09-17

## 2018-09-17 DIAGNOSIS — R93.5 ABNORMAL CT OF THE ABDOMEN: ICD-10-CM

## 2018-09-17 DIAGNOSIS — R93.89 ABNORMAL CT SCAN: ICD-10-CM

## 2018-09-17 DIAGNOSIS — R91.8 LUNG NODULES: Primary | ICD-10-CM

## 2018-09-19 ENCOUNTER — HOSPITAL ENCOUNTER (OUTPATIENT)
Dept: CT IMAGING | Facility: HOSPITAL | Age: 43
Discharge: HOME | End: 2018-09-19
Admitting: NURSE PRACTITIONER

## 2018-09-19 DIAGNOSIS — R93.89 ABNORMAL CT SCAN: ICD-10-CM

## 2018-09-19 DIAGNOSIS — R91.8 LUNG NODULES: ICD-10-CM

## 2018-09-19 PROCEDURE — 71250 CT THORAX DX C-: CPT

## 2018-09-19 PROCEDURE — 71250 CT THORAX DX C-: CPT | Performed by: RADIOLOGY

## 2018-10-19 DIAGNOSIS — R06.02 SOB (SHORTNESS OF BREATH): Primary | ICD-10-CM

## 2018-10-22 ENCOUNTER — HOSPITAL ENCOUNTER (OUTPATIENT)
Dept: GENERAL RADIOLOGY | Facility: HOSPITAL | Age: 43
Discharge: HOME | End: 2018-10-22
Attending: INTERNAL MEDICINE | Admitting: INTERNAL MEDICINE

## 2018-10-22 DIAGNOSIS — R06.02 SOB (SHORTNESS OF BREATH): Primary | ICD-10-CM

## 2018-10-22 DIAGNOSIS — R06.02 SOB (SHORTNESS OF BREATH): ICD-10-CM

## 2018-10-22 PROCEDURE — 71046 X-RAY EXAM CHEST 2 VIEWS: CPT

## 2018-10-22 PROCEDURE — 71046 X-RAY EXAM CHEST 2 VIEWS: CPT | Performed by: RADIOLOGY

## 2018-10-24 ENCOUNTER — OFFICE VISIT (OUTPATIENT)
Dept: PULMONOLOGY | Facility: CLINIC | Age: 43
End: 2018-10-24

## 2018-10-24 VITALS
WEIGHT: 143 LBS | DIASTOLIC BLOOD PRESSURE: 72 MMHG | BODY MASS INDEX: 27 KG/M2 | OXYGEN SATURATION: 98 % | TEMPERATURE: 98.1 F | HEART RATE: 72 BPM | SYSTOLIC BLOOD PRESSURE: 110 MMHG | HEIGHT: 61 IN

## 2018-10-24 DIAGNOSIS — R91.8 PULMONARY NODULES/LESIONS, MULTIPLE: Primary | ICD-10-CM

## 2018-10-24 DIAGNOSIS — R91.8 PULMONARY NODULES: ICD-10-CM

## 2018-10-24 PROCEDURE — 99204 OFFICE O/P NEW MOD 45 MIN: CPT | Performed by: INTERNAL MEDICINE

## 2018-10-24 RX ORDER — BUSPIRONE HYDROCHLORIDE 10 MG/1
10 TABLET ORAL 2 TIMES DAILY
COMMUNITY

## 2018-10-24 NOTE — PROGRESS NOTES
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  She is a Healthy Lady With no chest related problems and denies having any GI Or  issues other than recurrent daily Stones.  She is a nonsmoker exposed to some secondhand .  Primary to 3 times a day currently Not  and Has with a Boyfriend.  She had a CAT scan of the abdomen August 30 that showed some right lower lobe haziness and left lower lobe nodule sig stated that detected was nodules and ended up having CT of the chest September 9 that showed several pulmonary nodules.  Grandfather and father had to lung cancer and a grandmother had breast cancer      Review of Systems healthy athletic no problem other than recurrent kidney stones specifically No GI or  problems and Chest related E [place] review of systems noncontributory    Active Problems:  Problem List Items Addressed This Visit        Respiratory    Pulmonary nodules      Other Visit Diagnoses     Pulmonary nodules/lesions, multiple    -  Primary    Relevant Orders    NM Pet Skull Base To Mid Thigh          Past Medical History:  Past Medical History:   Diagnosis Date   • Depression    • GERD (gastroesophageal reflux disease)    • Kidney stone        Family History:  History reviewed. No pertinent family history.    Social History:  Social History   Substance Use Topics   • Smoking status: Never Smoker   • Smokeless tobacco: Current User     Types: Snuff   • Alcohol use No      Comment: OCASSIONAL       Current Medications:  Current Outpatient Prescriptions   Medication Sig Dispense Refill   • busPIRone (BUSPAR) 10 MG tablet Take 10 mg by mouth 3 (Three) Times a Day.     • FLUoxetine (PROzac) 20 MG capsule Take 30 mg by mouth Daily.     • RaNITidine HCl (RANITIDINE 75 PO) Take 1 tablet by mouth Daily.     • HYDROcodone-acetaminophen (NORCO) 5-325 MG per  "tablet Take 1 tablet by mouth Every 6 (Six) Hours As Needed for Moderate Pain  for up to 12 doses. 12 tablet 0   • ketorolac (TORADOL) 10 MG tablet Take 1 tablet by mouth Every 6 (Six) Hours As Needed for Moderate Pain . 12 tablet 0   • ondansetron ODT (ZOFRAN-ODT) 4 MG disintegrating tablet Take 1 tablet by mouth 4 (Four) Times a Day. 15 tablet 0   • sulfamethoxazole-trimethoprim (BACTRIM DS,SEPTRA DS) 800-160 MG per tablet Take 1 tablet by mouth 2 (Two) Times a Day. 12 tablet 0   • tamsulosin (FLOMAX) 0.4 MG capsule 24 hr capsule Take 1 capsule by mouth 2 (Two) Times a Day. 10 capsule 0     No current facility-administered medications for this visit.        Allergies:  No Known Allergies    Vitals:  /72   Pulse 72   Temp 98.1 °F (36.7 °C)   Ht 154.9 cm (61\")   Wt 64.9 kg (143 lb)   SpO2 98%   BMI 27.02 kg/m²     Physical Exam healthy-looking Young lady In no acute distress vital signs stable O2 sat 98% on room air lymphadenopathy lungs clear heart regular no clubbing cyanosis or joint deformity    Imaging: Chest x-rays and clear ×2 CT abdomen August 30 showed a right lower lobe haziness and left lower lobe nodule 80 chest of September 9 18 showed 3 nodules and one is 6.4 mm to 1 field millimeters and midshaft 56 chest is 12 mm pleural-based possibly calcified on image #64 of the CT chest    PFT: No PFT today expect that was normal PFT May get one the next visit  Results for orders placed or performed during the hospital encounter of 08/30/18   Urine Culture - Urine,   Result Value Ref Range    Urine Culture <10,000 CFU/mL Normal Urogenital Ludy    Comprehensive Metabolic Panel   Result Value Ref Range    Glucose 83 70 - 110 mg/dL    BUN 16 7 - 21 mg/dL    Creatinine 0.75 0.43 - 1.29 mg/dL    Sodium 140 135 - 153 mmol/L    Potassium 3.8 3.5 - 5.3 mmol/L    Chloride 108 99 - 112 mmol/L    CO2 23.9 (L) 24.3 - 31.9 mmol/L    Calcium 9.2 7.7 - 10.0 mg/dL    Total Protein 6.9 6.0 - 8.0 g/dL    Albumin 4.40 " 3.50 - 5.00 g/dL    ALT (SGPT) 23 10 - 36 U/L    AST (SGOT) 19 10 - 30 U/L    Alkaline Phosphatase 63 35 - 104 U/L    Total Bilirubin 0.6 0.2 - 1.8 mg/dL    eGFR Non African Amer 86 >60 mL/min/1.73    Globulin 2.5 gm/dL    A/G Ratio 1.8 1.5 - 2.5 g/dL    BUN/Creatinine Ratio 21.3 7.0 - 25.0    Anion Gap 8.1 3.6 - 11.2 mmol/L   Pregnancy, Urine - Urine, Clean Catch   Result Value Ref Range    HCG, Urine QL Negative Negative   Urinalysis With Culture If Indicated - Urine, Clean Catch   Result Value Ref Range    Color, UA Yellow Yellow, Straw    Appearance, UA Clear Clear    pH, UA <=5.0 5.0 - 8.0    Specific Gravity, UA 1.026 1.005 - 1.030    Glucose, UA Negative Negative    Ketones, UA >=160 mg/dL (4+) (A) Negative    Bilirubin, UA Negative Negative    Blood, UA Large (3+) (A) Negative    Protein, UA Negative Negative    Leuk Esterase, UA Small (1+) (A) Negative    Nitrite, UA Negative Negative    Urobilinogen, UA 0.2 E.U./dL 0.2 - 1.0 E.U./dL   CBC Auto Differential   Result Value Ref Range    WBC 8.90 4.50 - 12.50 10*3/mm3    RBC 3.83 (L) 4.20 - 5.40 10*6/mm3    Hemoglobin 12.1 12.0 - 16.0 g/dL    Hematocrit 35.0 (L) 37.0 - 47.0 %    MCV 91.4 80.0 - 94.0 fL    MCH 31.6 27.0 - 33.0 pg    MCHC 34.6 33.0 - 37.0 g/dL    RDW 12.1 11.5 - 14.5 %    RDW-SD 39.3 37.0 - 54.0 fl    MPV 10.3 (H) 6.0 - 10.0 fL    Platelets 283 130 - 400 10*3/mm3    Neutrophil % 72.6 (H) 30.0 - 70.0 %    Lymphocyte % 19.2 (L) 21.0 - 51.0 %    Monocyte % 6.7 0.0 - 10.0 %    Eosinophil % 0.2 0.0 - 5.0 %    Basophil % 0.3 0.0 - 2.0 %    Immature Grans % 1.0 (H) 0.0 - 0.5 %    Neutrophils, Absolute 6.45 1.40 - 6.50 10*3/mm3    Lymphocytes, Absolute 1.71 1.00 - 3.00 10*3/mm3    Monocytes, Absolute 0.60 0.10 - 0.90 10*3/mm3    Eosinophils, Absolute 0.02 0.00 - 0.70 10*3/mm3    Basophils, Absolute 0.03 0.00 - 0.30 10*3/mm3    Immature Grans, Absolute 0.09 (H) 0.00 - 0.03 10*3/mm3   Urinalysis, Microscopic Only - Urine, Clean Catch   Result Value Ref  Range    RBC, UA 6-12 (A) None Seen, 0-2 /HPF    WBC, UA 6-12 (A) None Seen, 0-2 /HPF    Bacteria, UA 2+ (A) None Seen /HPF    Squamous Epithelial Cells, UA 3-6 (A) None Seen, 0-2 /HPF    Hyaline Casts, UA None Seen None Seen /LPF    Methodology Automated Microscopy    Osmolality, Calculated   Result Value Ref Range    Osmolality Calc 279.7 273.0 - 305.0 mOsm/kg           ASSESSMENT AND DIAGNOSIS: Multiple pulmonary nodules in a nonsmoker but no known malignancy.  Reviewed the images with the patient and explained to her that the mole is Inactive scarring and active fungal infection or malignancy Possibly coming from some place else.  Her CBC and chemistry The chart were unremarkable  Ordered a PET scan and a mammogram.  Her CT abdomen has been unremarkable she should have a Pelvic exam if the PET scan is positive and he may need to get a CT-guided biopsy of the Left lower lobe lesion that is close to the surface and proceed accordingly      Follow-Up: Recommend back after PET scan and mammogram

## 2018-11-06 DIAGNOSIS — Z00.00 ROUTINE ADULT HEALTH MAINTENANCE: Primary | ICD-10-CM

## 2018-11-09 DIAGNOSIS — Z00.00 ROUTINE ADULT HEALTH MAINTENANCE: Primary | ICD-10-CM

## 2018-11-14 ENCOUNTER — APPOINTMENT (OUTPATIENT)
Dept: MAMMOGRAPHY | Facility: HOSPITAL | Age: 43
End: 2018-11-14

## 2018-11-16 ENCOUNTER — HOSPITAL ENCOUNTER (OUTPATIENT)
Dept: PET IMAGING | Facility: HOSPITAL | Age: 43
Discharge: HOME | End: 2018-11-16
Attending: INTERNAL MEDICINE | Admitting: INTERNAL MEDICINE

## 2018-11-16 DIAGNOSIS — R91.8 PULMONARY NODULES/LESIONS, MULTIPLE: ICD-10-CM

## 2018-11-16 PROCEDURE — 78815 PET IMAGE W/CT SKULL-THIGH: CPT | Performed by: RADIOLOGY

## 2018-11-16 PROCEDURE — 0 FLUDEOXYGLUCOSE F18 SOLUTION: Performed by: INTERNAL MEDICINE

## 2018-11-16 PROCEDURE — 78815 PET IMAGE W/CT SKULL-THIGH: CPT

## 2018-11-16 PROCEDURE — A9552 F18 FDG: HCPCS | Performed by: INTERNAL MEDICINE

## 2018-11-16 RX ADMIN — FLUDEOXYGLUCOSE F18 1 DOSE: 300 INJECTION INTRAVENOUS at 14:19

## 2018-11-28 ENCOUNTER — OFFICE VISIT (OUTPATIENT)
Dept: PULMONOLOGY | Facility: CLINIC | Age: 43
End: 2018-11-28

## 2018-11-28 VITALS
BODY MASS INDEX: 28.47 KG/M2 | HEIGHT: 60 IN | OXYGEN SATURATION: 99 % | WEIGHT: 145 LBS | SYSTOLIC BLOOD PRESSURE: 126 MMHG | DIASTOLIC BLOOD PRESSURE: 86 MMHG | HEART RATE: 71 BPM

## 2018-11-28 DIAGNOSIS — R91.8 PULMONARY NODULES/LESIONS, MULTIPLE: Primary | ICD-10-CM

## 2018-11-28 PROCEDURE — 99213 OFFICE O/P EST LOW 20 MIN: CPT | Performed by: INTERNAL MEDICINE

## 2018-11-28 RX ORDER — LORATADINE 10 MG/1
10 TABLET ORAL DAILY
COMMUNITY
End: 2022-12-06

## 2018-12-21 ENCOUNTER — HOSPITAL ENCOUNTER (OUTPATIENT)
Dept: MAMMOGRAPHY | Facility: HOSPITAL | Age: 43
Discharge: HOME | End: 2018-12-21
Admitting: INTERNAL MEDICINE

## 2018-12-21 DIAGNOSIS — Z00.00 ROUTINE ADULT HEALTH MAINTENANCE: ICD-10-CM

## 2018-12-21 PROCEDURE — 77067 SCR MAMMO BI INCL CAD: CPT

## 2018-12-21 PROCEDURE — 77063 BREAST TOMOSYNTHESIS BI: CPT

## 2018-12-21 PROCEDURE — 77067 SCR MAMMO BI INCL CAD: CPT | Performed by: RADIOLOGY

## 2018-12-21 PROCEDURE — 77063 BREAST TOMOSYNTHESIS BI: CPT | Performed by: RADIOLOGY

## 2019-05-30 ENCOUNTER — HOSPITAL ENCOUNTER (OUTPATIENT)
Dept: GENERAL RADIOLOGY | Facility: HOSPITAL | Age: 44
Discharge: HOME | End: 2019-05-30
Admitting: UROLOGY

## 2019-05-30 ENCOUNTER — OFFICE VISIT (OUTPATIENT)
Dept: UROLOGY | Facility: CLINIC | Age: 44
End: 2019-05-30

## 2019-05-30 VITALS — WEIGHT: 143 LBS | BODY MASS INDEX: 28.07 KG/M2 | HEIGHT: 60 IN

## 2019-05-30 DIAGNOSIS — N20.0 KIDNEY STONES: Primary | ICD-10-CM

## 2019-05-30 LAB
BILIRUB BLD-MCNC: NEGATIVE MG/DL
CLARITY, POC: CLEAR
COLOR UR: YELLOW
GLUCOSE UR STRIP-MCNC: NEGATIVE MG/DL
KETONES UR QL: NEGATIVE
LEUKOCYTE EST, POC: ABNORMAL
NITRITE UR-MCNC: NEGATIVE MG/ML
PH UR: 5.5 [PH] (ref 5–8)
PROT UR STRIP-MCNC: NEGATIVE MG/DL
RBC # UR STRIP: NEGATIVE /UL
SP GR UR: 1.03 (ref 1–1.03)
UROBILINOGEN UR QL: NORMAL

## 2019-05-30 PROCEDURE — 74018 RADEX ABDOMEN 1 VIEW: CPT | Performed by: RADIOLOGY

## 2019-05-30 PROCEDURE — 99203 OFFICE O/P NEW LOW 30 MIN: CPT | Performed by: UROLOGY

## 2019-05-30 PROCEDURE — 87086 URINE CULTURE/COLONY COUNT: CPT | Performed by: UROLOGY

## 2019-05-30 PROCEDURE — 74018 RADEX ABDOMEN 1 VIEW: CPT

## 2019-05-30 RX ORDER — AMOXICILLIN AND CLAVULANATE POTASSIUM 875; 125 MG/1; MG/1
1 TABLET, FILM COATED ORAL 2 TIMES DAILY
Refills: 0 | COMMUNITY
Start: 2019-05-03 | End: 2019-12-11

## 2019-05-30 RX ORDER — HYDROCODONE BITARTRATE AND ACETAMINOPHEN 5; 325 MG/1; MG/1
TABLET ORAL
Qty: 28 TABLET | Refills: 0 | Status: SHIPPED | OUTPATIENT
Start: 2019-05-30 | End: 2019-12-11

## 2019-05-30 RX ORDER — TAMSULOSIN HYDROCHLORIDE 0.4 MG/1
1 CAPSULE ORAL NIGHTLY
Qty: 30 CAPSULE | Refills: 11 | Status: SHIPPED | OUTPATIENT
Start: 2019-05-30 | End: 2019-12-11

## 2019-05-30 NOTE — PROGRESS NOTES
Chief Complaint:          Kidney stones    HPI:   43 y.o. female.  Pt has recurrent stone disease usually passes one a year.  Her stones are always on the right side.  She has had ESWL when she was 13.    HPI      Past Medical History:        Past Medical History:   Diagnosis Date   • Depression    • GERD (gastroesophageal reflux disease)    • Kidney stone          Current Meds:     Current Outpatient Medications   Medication Sig Dispense Refill   • amoxicillin-clavulanate (AUGMENTIN) 875-125 MG per tablet Take 1 tablet by mouth 2 (Two) Times a Day.  0   • CRANBERRY PO Take  by mouth.     • FLUoxetine (PROzac) 20 MG capsule Take 20 mg by mouth Daily.     • loratadine (CLARITIN) 10 MG tablet Take 10 mg by mouth Daily.     • Multiple Vitamins-Minerals (CENTRUM ADULTS PO) Take  by mouth.     • busPIRone (BUSPAR) 10 MG tablet Take 10 mg by mouth 2 (Two) Times a Day.     • HYDROcodone-acetaminophen (NORCO) 5-325 MG per tablet Take 1 tablet by mouth Every 6 (Six) Hours As Needed for Moderate Pain  for up to 12 doses. 12 tablet 0   • ketorolac (TORADOL) 10 MG tablet Take 1 tablet by mouth Every 6 (Six) Hours As Needed for Moderate Pain . 12 tablet 0   • ondansetron ODT (ZOFRAN-ODT) 4 MG disintegrating tablet Take 1 tablet by mouth 4 (Four) Times a Day. 15 tablet 0   • RaNITidine HCl (RANITIDINE 75 PO) Take 1 tablet by mouth Daily.     • sulfamethoxazole-trimethoprim (BACTRIM DS,SEPTRA DS) 800-160 MG per tablet Take 1 tablet by mouth 2 (Two) Times a Day. 12 tablet 0   • tamsulosin (FLOMAX) 0.4 MG capsule 24 hr capsule Take 1 capsule by mouth 2 (Two) Times a Day. 10 capsule 0     No current facility-administered medications for this visit.         Allergies:      No Known Allergies     Past Surgical History:     History reviewed. No pertinent surgical history.      Social History:     Social History     Socioeconomic History   • Marital status:      Spouse name: Not on file   • Number of children: Not on file   •  Years of education: Not on file   • Highest education level: Not on file   Tobacco Use   • Smoking status: Never Smoker   • Smokeless tobacco: Current User     Types: Snuff   Substance and Sexual Activity   • Alcohol use: Yes     Comment: OCASSIONAL   • Drug use: No   • Sexual activity: Yes       Family History:     Family History   Problem Relation Age of Onset   • Breast cancer Maternal Grandmother    • No Known Problems Father    • No Known Problems Mother        Review of Systems:     Review of Systems   Constitutional: Negative for chills and fever.   HENT: Negative for sinus pressure and sinus pain.    Respiratory: Negative for cough.    Cardiovascular: Negative for leg swelling.   Gastrointestinal: Positive for abdominal pain and nausea.   Genitourinary: Positive for flank pain and frequency. Negative for dysuria and urgency.   Neurological: Negative for headaches.   Psychiatric/Behavioral: The patient is not nervous/anxious.        IPSS Questionnaire (AUA-7):  Over the past month…    1)  Incomplete Emptying  How often have you had a sensation of not emptying your bladder?  0 - Not at all   2)  Frequency  How often have you had to urinate less than every two hours? 3 - About half the time   3)  Intermittency  How often have you found you stopped and started again several times when you urinated?  0 - Not at all   4) Urgency  How often have you found it difficult to postpone urination?  0 - Not at all   5) Weak Stream  How often have you had a weak urinary stream?  0 - Not at all   6) Straining  How often have you had to push or strain to begin urination?  2 - Less than half the time   7) Nocturia  How many times did you typically get up at night to urinate?  2 - 2 times   Total Score:  7       Quality of life due to urinary symptoms:  If you were to spend the rest of your life with your urinary condition the way it is now, how would you feel about that? 2-Mostly Satisfied   Urine Leakage (Incontinence) 0-No  "Leakage       I have reviewed the follow portions of the patient's history and confirmed they are accurate today:  allergies, current medications, past family history, past medical history, past social history, past surgical history, problem list and ROS  Physical Exam:     Physical Exam   Constitutional: She is oriented to person, place, and time. She appears well-developed.   HENT:   Head: Normocephalic.   Right Ear: External ear normal.   Left Ear: External ear normal.   Nose: Nose normal.   Mouth/Throat: Oropharynx is clear and moist.   Eyes: Conjunctivae and EOM are normal. Pupils are equal, round, and reactive to light.   Neck: Normal range of motion. Neck supple. No tracheal deviation present. No thyromegaly present.   Cardiovascular: Normal heart sounds. Exam reveals no gallop and no friction rub.   No murmur heard.  Pulmonary/Chest: Effort normal and breath sounds normal. No respiratory distress. She has no wheezes. She has no rales. She exhibits no tenderness.   Abdominal: Soft. Bowel sounds are normal. She exhibits no distension and no mass. There is no tenderness. There is no rebound and no guarding. No hernia.   Musculoskeletal: Normal range of motion. She exhibits no edema.   Lymphadenopathy:     She has no cervical adenopathy.   Neurological: She is alert and oriented to person, place, and time. She displays normal reflexes. No cranial nerve deficit. She exhibits normal muscle tone. Coordination normal.   Skin: Skin is warm. No rash noted.   Psychiatric: She has a normal mood and affect. Judgment normal.       Ht 152.4 cm (60\")   Wt 64.9 kg (143 lb)   BMI 27.93 kg/m²    Procedure:         Assessment:     Encounter Diagnosis   Name Primary?   • Kidney stones Yes       Orders Placed This Encounter   Procedures   • Urine Culture - Urine, Urine, Clean Catch   • XR Abdomen KUB     Order Specific Question:   Reason for Exam:     Answer:   kidneys stones     Order Specific Question:   Patient Pregnant     " Answer:   Unknown   • POC Urinalysis Dipstick, Automated         Plan:   I would recommend right ESWL    Patient's Body mass index is 27.93 kg/m². BMI is within normal parameters. No follow-up required..      Counseling was given to patient for the following topics impressions as follows: kidney stone. The interim medical history and current results were reviewed.  A treatment plan with follow-up was made for Kidney stones [N20.0].   This document has been electronically signed by Vivek Tse MD May 30, 2019 3:51 PM

## 2019-05-31 ENCOUNTER — ANESTHESIA (OUTPATIENT)
Dept: PERIOP | Facility: HOSPITAL | Age: 44
End: 2019-05-31

## 2019-05-31 ENCOUNTER — HOSPITAL ENCOUNTER (OUTPATIENT)
Facility: HOSPITAL | Age: 44
Setting detail: HOSPITAL OUTPATIENT SURGERY
Discharge: HOME | End: 2019-05-31
Attending: UROLOGY | Admitting: UROLOGY

## 2019-05-31 ENCOUNTER — ANESTHESIA EVENT (OUTPATIENT)
Dept: PERIOP | Facility: HOSPITAL | Age: 44
End: 2019-05-31

## 2019-05-31 VITALS
HEART RATE: 73 BPM | OXYGEN SATURATION: 99 % | SYSTOLIC BLOOD PRESSURE: 127 MMHG | BODY MASS INDEX: 28.11 KG/M2 | DIASTOLIC BLOOD PRESSURE: 90 MMHG | WEIGHT: 143.2 LBS | TEMPERATURE: 98.5 F | HEIGHT: 60 IN | RESPIRATION RATE: 18 BRPM

## 2019-05-31 DIAGNOSIS — N20.0 KIDNEY STONES: Primary | ICD-10-CM

## 2019-05-31 LAB
B-HCG UR QL: NEGATIVE
BACTERIA SPEC AEROBE CULT: NO GROWTH
INTERNAL NEGATIVE CONTROL: NEGATIVE
INTERNAL POSITIVE CONTROL: POSITIVE
Lab: NORMAL

## 2019-05-31 PROCEDURE — 25010000002 MIDAZOLAM PER 1 MG: Performed by: NURSE ANESTHETIST, CERTIFIED REGISTERED

## 2019-05-31 PROCEDURE — 25010000003 CEFAZOLIN SODIUM-DEXTROSE 2-3 GM-%(50ML) RECONSTITUTED SOLUTION: Performed by: UROLOGY

## 2019-05-31 PROCEDURE — 50590 FRAGMENTING OF KIDNEY STONE: CPT | Performed by: UROLOGY

## 2019-05-31 PROCEDURE — 25010000002 ONDANSETRON PER 1 MG: Performed by: NURSE ANESTHETIST, CERTIFIED REGISTERED

## 2019-05-31 PROCEDURE — 25010000002 FENTANYL CITRATE (PF) 100 MCG/2ML SOLUTION: Performed by: NURSE ANESTHETIST, CERTIFIED REGISTERED

## 2019-05-31 PROCEDURE — 81025 URINE PREGNANCY TEST: CPT | Performed by: ANESTHESIOLOGY

## 2019-05-31 PROCEDURE — 25010000002 PROPOFOL 10 MG/ML EMULSION: Performed by: NURSE ANESTHETIST, CERTIFIED REGISTERED

## 2019-05-31 PROCEDURE — 25010000002 DEXAMETHASONE PER 1 MG: Performed by: NURSE ANESTHETIST, CERTIFIED REGISTERED

## 2019-05-31 RX ORDER — SODIUM CHLORIDE, SODIUM LACTATE, POTASSIUM CHLORIDE, CALCIUM CHLORIDE 600; 310; 30; 20 MG/100ML; MG/100ML; MG/100ML; MG/100ML
125 INJECTION, SOLUTION INTRAVENOUS CONTINUOUS
Status: DISCONTINUED | OUTPATIENT
Start: 2019-05-31 | End: 2019-05-31 | Stop reason: HOSPADM

## 2019-05-31 RX ORDER — MEPERIDINE HYDROCHLORIDE 25 MG/ML
12.5 INJECTION INTRAMUSCULAR; INTRAVENOUS; SUBCUTANEOUS
Status: DISCONTINUED | OUTPATIENT
Start: 2019-05-31 | End: 2019-05-31 | Stop reason: HOSPADM

## 2019-05-31 RX ORDER — MIDAZOLAM HYDROCHLORIDE 1 MG/ML
INJECTION INTRAMUSCULAR; INTRAVENOUS AS NEEDED
Status: DISCONTINUED | OUTPATIENT
Start: 2019-05-31 | End: 2019-05-31 | Stop reason: SURG

## 2019-05-31 RX ORDER — ONDANSETRON 2 MG/ML
4 INJECTION INTRAMUSCULAR; INTRAVENOUS ONCE AS NEEDED
Status: DISCONTINUED | OUTPATIENT
Start: 2019-05-31 | End: 2019-05-31 | Stop reason: HOSPADM

## 2019-05-31 RX ORDER — OXYCODONE HYDROCHLORIDE AND ACETAMINOPHEN 5; 325 MG/1; MG/1
1 TABLET ORAL ONCE AS NEEDED
Status: DISCONTINUED | OUTPATIENT
Start: 2019-05-31 | End: 2019-05-31 | Stop reason: HOSPADM

## 2019-05-31 RX ORDER — SODIUM CHLORIDE 0.9 % (FLUSH) 0.9 %
3 SYRINGE (ML) INJECTION EVERY 12 HOURS SCHEDULED
Status: DISCONTINUED | OUTPATIENT
Start: 2019-05-31 | End: 2019-05-31 | Stop reason: HOSPADM

## 2019-05-31 RX ORDER — SODIUM CHLORIDE 0.9 % (FLUSH) 0.9 %
3-10 SYRINGE (ML) INJECTION AS NEEDED
Status: DISCONTINUED | OUTPATIENT
Start: 2019-05-31 | End: 2019-05-31 | Stop reason: HOSPADM

## 2019-05-31 RX ORDER — ONDANSETRON 2 MG/ML
INJECTION INTRAMUSCULAR; INTRAVENOUS AS NEEDED
Status: DISCONTINUED | OUTPATIENT
Start: 2019-05-31 | End: 2019-05-31 | Stop reason: SURG

## 2019-05-31 RX ORDER — DEXAMETHASONE SODIUM PHOSPHATE 4 MG/ML
INJECTION, SOLUTION INTRA-ARTICULAR; INTRALESIONAL; INTRAMUSCULAR; INTRAVENOUS; SOFT TISSUE AS NEEDED
Status: DISCONTINUED | OUTPATIENT
Start: 2019-05-31 | End: 2019-05-31 | Stop reason: SURG

## 2019-05-31 RX ORDER — CEFAZOLIN SODIUM 2 G/50ML
2 SOLUTION INTRAVENOUS ONCE
Status: COMPLETED | OUTPATIENT
Start: 2019-05-31 | End: 2019-05-31

## 2019-05-31 RX ORDER — FENTANYL CITRATE 50 UG/ML
INJECTION, SOLUTION INTRAMUSCULAR; INTRAVENOUS AS NEEDED
Status: DISCONTINUED | OUTPATIENT
Start: 2019-05-31 | End: 2019-05-31 | Stop reason: SURG

## 2019-05-31 RX ORDER — IPRATROPIUM BROMIDE AND ALBUTEROL SULFATE 2.5; .5 MG/3ML; MG/3ML
3 SOLUTION RESPIRATORY (INHALATION) ONCE AS NEEDED
Status: DISCONTINUED | OUTPATIENT
Start: 2019-05-31 | End: 2019-05-31 | Stop reason: HOSPADM

## 2019-05-31 RX ORDER — PROPOFOL 10 MG/ML
VIAL (ML) INTRAVENOUS AS NEEDED
Status: DISCONTINUED | OUTPATIENT
Start: 2019-05-31 | End: 2019-05-31 | Stop reason: SURG

## 2019-05-31 RX ORDER — FENTANYL CITRATE 50 UG/ML
50 INJECTION, SOLUTION INTRAMUSCULAR; INTRAVENOUS
Status: DISCONTINUED | OUTPATIENT
Start: 2019-05-31 | End: 2019-05-31 | Stop reason: HOSPADM

## 2019-05-31 RX ADMIN — ONDANSETRON 4 MG: 2 INJECTION, SOLUTION INTRAMUSCULAR; INTRAVENOUS at 11:28

## 2019-05-31 RX ADMIN — PROPOFOL 150 MG: 10 INJECTION, EMULSION INTRAVENOUS at 11:33

## 2019-05-31 RX ADMIN — SODIUM CHLORIDE, POTASSIUM CHLORIDE, SODIUM LACTATE AND CALCIUM CHLORIDE: 600; 310; 30; 20 INJECTION, SOLUTION INTRAVENOUS at 11:29

## 2019-05-31 RX ADMIN — SODIUM CHLORIDE, POTASSIUM CHLORIDE, SODIUM LACTATE AND CALCIUM CHLORIDE 125 ML/HR: 600; 310; 30; 20 INJECTION, SOLUTION INTRAVENOUS at 10:28

## 2019-05-31 RX ADMIN — MIDAZOLAM HYDROCHLORIDE 2 MG: 1 INJECTION, SOLUTION INTRAMUSCULAR; INTRAVENOUS at 11:28

## 2019-05-31 RX ADMIN — DEXAMETHASONE SODIUM PHOSPHATE 4 MG: 4 INJECTION, SOLUTION INTRAMUSCULAR; INTRAVENOUS at 11:33

## 2019-05-31 RX ADMIN — CEFAZOLIN SODIUM 2 G: 2 SOLUTION INTRAVENOUS at 11:30

## 2019-05-31 RX ADMIN — FENTANYL CITRATE 100 MCG: 50 INJECTION INTRAMUSCULAR; INTRAVENOUS at 11:28

## 2019-05-31 NOTE — ANESTHESIA POSTPROCEDURE EVALUATION
Patient: Annalisa Whittington    Procedure Summary     Date:  05/31/19 Room / Location:  [facility] OR 08 /  [facility] OR    Anesthesia Start:  1129 Anesthesia Stop:  1205    Procedure:  EXTRACORPOREAL SHOCKWAVE LITHOTRIPSY (Right ) Diagnosis:       Kidney stones      (Kidney stones [N20.0])    Surgeon:  Vivek Tse MD Provider:  Scar Pinto MD    Anesthesia Type:  general ASA Status:  3          Anesthesia Type: general  Last vitals  BP   116/79 (05/31/19 1220)   Temp   98.3 °F (36.8 °C) (05/31/19 1024)   Pulse   72 (05/31/19 1220)   Resp   20 (05/31/19 1220)     SpO2   97 % (05/31/19 1220)     Post Anesthesia Care and Evaluation    Patient location during evaluation: PHASE II  Patient participation: complete - patient participated  Level of consciousness: awake and alert  Pain score: 1  Pain management: adequate  Airway patency: patent  Anesthetic complications: No anesthetic complications  PONV Status: controlled  Cardiovascular status: acceptable  Respiratory status: acceptable  Hydration status: acceptable

## 2019-05-31 NOTE — ANESTHESIA PREPROCEDURE EVALUATION
Anesthesia Evaluation     Patient summary reviewed and Nursing notes reviewed   no history of anesthetic complications:  NPO Solid Status: > 8 hours  NPO Liquid Status: > 8 hours           Airway   Mallampati: II  TM distance: >3 FB  Neck ROM: full  no difficulty expected  Dental - normal exam     Pulmonary - negative pulmonary ROS and normal exam   (-) asthma, not a smoker  Cardiovascular - negative cardio ROS and normal exam  Exercise tolerance: good (4-7 METS)    NYHA Classification: II    (-) hypertension, dysrhythmias, angina, CHF      Neuro/Psych  (+) psychiatric history Depression,     (-) seizures  GI/Hepatic/Renal/Endo    (+)  GERD,  renal disease stones,   (-) diabetes, hypothyroidism    Musculoskeletal (-) negative ROS    Abdominal  - normal exam    Bowel sounds: normal.   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  Use of blood products discussed with patient  Consented to blood products.

## 2019-12-11 ENCOUNTER — OFFICE VISIT (OUTPATIENT)
Dept: RETAIL CLINIC | Facility: CLINIC | Age: 44
End: 2019-12-11

## 2019-12-11 VITALS
WEIGHT: 157.2 LBS | OXYGEN SATURATION: 98 % | TEMPERATURE: 99.9 F | DIASTOLIC BLOOD PRESSURE: 76 MMHG | BODY MASS INDEX: 30.7 KG/M2 | RESPIRATION RATE: 18 BRPM | HEART RATE: 87 BPM | SYSTOLIC BLOOD PRESSURE: 128 MMHG

## 2019-12-11 DIAGNOSIS — J40 BRONCHITIS: ICD-10-CM

## 2019-12-11 DIAGNOSIS — J01.00 ACUTE MAXILLARY SINUSITIS, RECURRENCE NOT SPECIFIED: Primary | ICD-10-CM

## 2019-12-11 PROCEDURE — 96372 THER/PROPH/DIAG INJ SC/IM: CPT | Performed by: NURSE PRACTITIONER

## 2019-12-11 PROCEDURE — 99213 OFFICE O/P EST LOW 20 MIN: CPT | Performed by: NURSE PRACTITIONER

## 2019-12-11 RX ORDER — PREDNISONE 10 MG/1
TABLET ORAL
Qty: 21 TABLET | Refills: 0 | Status: SHIPPED | OUTPATIENT
Start: 2019-12-11 | End: 2020-03-04

## 2019-12-11 RX ORDER — FLUOXETINE HYDROCHLORIDE 40 MG/1
40 CAPSULE ORAL EVERY MORNING
Refills: 3 | COMMUNITY
Start: 2019-11-13 | End: 2022-09-08 | Stop reason: SDUPTHER

## 2019-12-11 RX ORDER — ALBUTEROL SULFATE 2.5 MG/3ML
2.5 SOLUTION RESPIRATORY (INHALATION) EVERY 4 HOURS PRN
Qty: 60 VIAL | Refills: 0 | Status: SHIPPED | OUTPATIENT
Start: 2019-12-11 | End: 2019-12-26

## 2019-12-11 RX ORDER — ALBUTEROL SULFATE 90 UG/1
2 AEROSOL, METERED RESPIRATORY (INHALATION) EVERY 4 HOURS PRN
Qty: 1 INHALER | Refills: 0 | Status: SHIPPED | OUTPATIENT
Start: 2019-12-11 | End: 2020-01-10

## 2019-12-11 RX ORDER — METHYLPREDNISOLONE ACETATE 80 MG/ML
80 INJECTION, SUSPENSION INTRA-ARTICULAR; INTRALESIONAL; INTRAMUSCULAR; SOFT TISSUE ONCE
Status: COMPLETED | OUTPATIENT
Start: 2019-12-11 | End: 2019-12-11

## 2019-12-11 RX ORDER — AMOXICILLIN AND CLAVULANATE POTASSIUM 875; 125 MG/1; MG/1
1 TABLET, FILM COATED ORAL 2 TIMES DAILY
Qty: 20 TABLET | Refills: 0 | Status: SHIPPED | OUTPATIENT
Start: 2019-12-11 | End: 2019-12-21

## 2019-12-11 RX ORDER — DEXTROMETHORPHAN HYDROBROMIDE AND PROMETHAZINE HYDROCHLORIDE 15; 6.25 MG/5ML; MG/5ML
5 SYRUP ORAL 4 TIMES DAILY PRN
Qty: 120 ML | Refills: 0 | Status: SHIPPED | OUTPATIENT
Start: 2019-12-11 | End: 2020-03-04

## 2019-12-11 RX ADMIN — METHYLPREDNISOLONE ACETATE 80 MG: 80 INJECTION, SUSPENSION INTRA-ARTICULAR; INTRALESIONAL; INTRAMUSCULAR; SOFT TISSUE at 08:45

## 2019-12-11 NOTE — PROGRESS NOTES
Piper Whittington is a 44 y.o. female.   Chief Complaint   Patient presents with   • URI      URI    This is a new problem. The current episode started 1 to 4 weeks ago (3 weeks). The problem has been gradually worsening. There has been no fever. Associated symptoms include congestion, coughing, headaches, rhinorrhea, sinus pain and wheezing. She has tried decongestant, NSAIDs and antihistamine (mucinex, primatene mist) for the symptoms. The treatment provided mild relief.        The following portions of the patient's history were reviewed and updated as appropriate: allergies, current medications, past family history, past medical history, past social history, past surgical history and problem list.    Review of Systems   Constitutional: Positive for fatigue and fever.   HENT: Positive for congestion, postnasal drip, rhinorrhea, sinus pressure and sinus pain.    Eyes: Negative.    Respiratory: Positive for cough, chest tightness and wheezing. Negative for shortness of breath.    Gastrointestinal: Negative.    Genitourinary: Negative.    Skin: Negative.    Allergic/Immunologic: Negative.    Neurological: Positive for headaches.   Psychiatric/Behavioral: Negative.    All other systems reviewed and are negative.      Objective   Allergies   Allergen Reactions   • Bee Venom Anaphylaxis   • Mold Extract [Trichophyton] Anaphylaxis   • Latex Swelling   • Pollen Extract Unknown (See Comments)     Not sure but knows allergic     • Shrimp Unknown (See Comments)     Acts as sedative       Physical Exam   Constitutional: She is oriented to person, place, and time. She appears well-developed and well-nourished.   HENT:   Right Ear: Tympanic membrane normal.   Left Ear: Tympanic membrane normal.   Nose: Mucosal edema, rhinorrhea and sinus tenderness present. Right sinus exhibits maxillary sinus tenderness. Left sinus exhibits maxillary sinus tenderness.   Mouth/Throat: Posterior oropharyngeal erythema present. No  oropharyngeal exudate.   Mucoid PND, cobblestoning   Eyes: Pupils are equal, round, and reactive to light.   Neck: Neck supple.   Cardiovascular: Normal rate and regular rhythm.   Pulmonary/Chest: Effort normal. She has wheezes (fine scattered throughout).   Abdominal: Soft. Bowel sounds are normal. There is no tenderness. There is no rigidity, no rebound and no guarding.   Musculoskeletal: Normal range of motion.   Neurological: She is alert and oriented to person, place, and time.   Skin: Skin is warm and dry. No rash noted.   Psychiatric: She has a normal mood and affect.   Vitals reviewed.      Assessment/Plan   Annalisa was seen today for uri.    Diagnoses and all orders for this visit:    Acute maxillary sinusitis, recurrence not specified    Bronchitis  -     methylPREDNISolone acetate (DEPO-medrol) injection 80 mg    Other orders  -     amoxicillin-clavulanate (AUGMENTIN) 875-125 MG per tablet; Take 1 tablet by mouth 2 (Two) Times a Day for 10 days.  -     predniSONE (DELTASONE) 10 MG tablet; Take as directed per 6 day pred savanah  -     albuterol sulfate  (90 Base) MCG/ACT inhaler; Inhale 2 puffs Every 4 (Four) Hours As Needed for Wheezing or Shortness of Air (cough) for up to 30 days.  -     albuterol (PROVENTIL) (2.5 MG/3ML) 0.083% nebulizer solution; Take 2.5 mg by nebulization Every 4 (Four) Hours As Needed for Wheezing or Shortness of Air (cough) for up to 15 days.  -     promethazine-dextromethorphan (PROMETHAZINE-DM) 6.25-15 MG/5ML syrup; Take 5 mL by mouth 4 (Four) Times a Day As Needed for Cough.                 This document has been electronically signed by ROCKY Gibbons December 11, 2019 9:13 AM

## 2020-03-04 ENCOUNTER — OFFICE VISIT (OUTPATIENT)
Dept: RETAIL CLINIC | Facility: CLINIC | Age: 45
End: 2020-03-04

## 2020-03-04 VITALS
BODY MASS INDEX: 26.7 KG/M2 | SYSTOLIC BLOOD PRESSURE: 136 MMHG | HEIGHT: 60 IN | RESPIRATION RATE: 20 BRPM | HEART RATE: 81 BPM | OXYGEN SATURATION: 99 % | DIASTOLIC BLOOD PRESSURE: 92 MMHG | TEMPERATURE: 98.3 F | WEIGHT: 136 LBS

## 2020-03-04 DIAGNOSIS — J01.40 ACUTE NON-RECURRENT PANSINUSITIS: Primary | ICD-10-CM

## 2020-03-04 PROCEDURE — 99213 OFFICE O/P EST LOW 20 MIN: CPT | Performed by: NURSE PRACTITIONER

## 2020-03-04 RX ORDER — AMOXICILLIN AND CLAVULANATE POTASSIUM 875; 125 MG/1; MG/1
1 TABLET, FILM COATED ORAL 2 TIMES DAILY
Qty: 20 TABLET | Refills: 0 | Status: SHIPPED | OUTPATIENT
Start: 2020-03-04 | End: 2020-03-14

## 2020-03-04 RX ORDER — METHYLPREDNISOLONE 4 MG/1
TABLET ORAL
Qty: 21 TABLET | Refills: 0 | Status: SHIPPED | OUTPATIENT
Start: 2020-03-04 | End: 2022-02-15

## 2020-03-04 RX ORDER — BROMPHENIRAMINE MALEATE, PSEUDOEPHEDRINE HYDROCHLORIDE, AND DEXTROMETHORPHAN HYDROBROMIDE 2; 30; 10 MG/5ML; MG/5ML; MG/5ML
5 SYRUP ORAL 4 TIMES DAILY PRN
Qty: 120 ML | Refills: 0 | Status: SHIPPED | OUTPATIENT
Start: 2020-03-04 | End: 2022-03-23

## 2020-03-04 NOTE — PROGRESS NOTES
Subjective   Annalisa Whittington is a 44 y.o. female.   Chief Complaint   Patient presents with   • Sinus Problem      45 yo w/f presents with complaint of sinus congestion, sinus pain, and unable to sleep past 2 nights.  She has had symptoms x 8 days.  She has had no fever.  She has used theraflu and zicam.  Refer to HPI/ROS for additional information.    Sinus Problem   This is a new problem. The current episode started 1 to 4 weeks ago. The problem has been waxing and waning since onset. There has been no fever. Her pain is at a severity of 8/10. Associated symptoms include headaches and sinus pressure. Pertinent negatives include no congestion. Treatments tried: as listed. The treatment provided no relief.        The following portions of the patient's history were reviewed and updated as appropriate: allergies, current medications, past family history, past medical history, past social history, past surgical history and problem list.    Current Outpatient Medications:   •  busPIRone (BUSPAR) 10 MG tablet, Take 10 mg by mouth 2 (Two) Times a Day., Disp: , Rfl:   •  FAMOTIDINE PO, Take  by mouth., Disp: , Rfl:   •  FLUoxetine (PROzac) 40 MG capsule, Take 40 mg by mouth Every Morning., Disp: , Rfl: 3  •  loratadine (CLARITIN) 10 MG tablet, Take 10 mg by mouth Daily., Disp: , Rfl:   •  Multiple Vitamins-Minerals (CENTRUM ADULTS PO), Take  by mouth., Disp: , Rfl:   •  amoxicillin-clavulanate (AUGMENTIN) 875-125 MG per tablet, Take 1 tablet by mouth 2 (Two) Times a Day for 10 days., Disp: 20 tablet, Rfl: 0  •  brompheniramine-pseudoephedrine-DM 30-2-10 MG/5ML syrup, Take 5 mL by mouth 4 (Four) Times a Day As Needed for Congestion., Disp: 120 mL, Rfl: 0  •  methylPREDNISolone (MEDROL, MK,) 4 MG tablet, Take as directed on package instructions., Disp: 21 tablet, Rfl: 0    Review of Systems   Constitutional: Positive for activity change.   HENT: Positive for sinus pressure and sinus pain. Negative for congestion.    Eyes:  "Negative.    Respiratory: Negative.    Cardiovascular: Negative.    Gastrointestinal: Negative.    Skin: Negative.    Neurological: Positive for headaches.   Psychiatric/Behavioral: Negative.      /92   Pulse 81   Temp 98.3 °F (36.8 °C) (Temporal)   Resp 20   Ht 152.4 cm (60\")   Wt 61.7 kg (136 lb)   SpO2 99%   BMI 26.56 kg/m²     Objective   Allergies   Allergen Reactions   • Bee Venom Anaphylaxis   • Mold Extract [Trichophyton] Anaphylaxis   • Latex Swelling   • Pollen Extract Unknown (See Comments)     Not sure but knows allergic     • Shrimp Unknown (See Comments)     Acts as sedative       Physical Exam   Constitutional: She is oriented to person, place, and time. She appears well-developed and well-nourished. No distress.   HENT:   Head: Normocephalic.   Right Ear: Hearing, tympanic membrane, external ear and ear canal normal.   Left Ear: Hearing, tympanic membrane, external ear and ear canal normal.   Nose: Mucosal edema present. Right sinus exhibits maxillary sinus tenderness and frontal sinus tenderness. Left sinus exhibits maxillary sinus tenderness and frontal sinus tenderness.   Mouth/Throat: Oropharynx is clear and moist.   Cardiovascular: Normal rate, regular rhythm and normal heart sounds.   Neurological: She is alert and oriented to person, place, and time.   Skin: Skin is warm. Capillary refill takes less than 2 seconds. She is not diaphoretic.   Psychiatric: She has a normal mood and affect. Her behavior is normal. Judgment and thought content normal.   Vitals reviewed.      Assessment/Plan   Annalisa was seen today for sinus problem.    Diagnoses and all orders for this visit:    Acute non-recurrent pansinusitis    Other orders  -     methylPREDNISolone (MEDROL, MK,) 4 MG tablet; Take as directed on package instructions.  -     brompheniramine-pseudoephedrine-DM 30-2-10 MG/5ML syrup; Take 5 mL by mouth 4 (Four) Times a Day As Needed for Congestion.  -     amoxicillin-clavulanate " (AUGMENTIN) 875-125 MG per tablet; Take 1 tablet by mouth 2 (Two) Times a Day for 10 days.          An After Visit Summary was printed, reviewed, and given to the patient. Understanding verbalized and agrees with treatment plan.  If no improvement or becomes worse, follow up with primary or go to [facility].            March 4, 2020 4:59 PM

## 2020-03-04 NOTE — PATIENT INSTRUCTIONS
Sinusitis, Adult  Sinusitis is soreness and swelling (inflammation) of your sinuses. Sinuses are hollow spaces in the bones around your face. They are located:  · Around your eyes.  · In the middle of your forehead.  · Behind your nose.  · In your cheekbones.  Your sinuses and nasal passages are lined with a fluid called mucus. Mucus drains out of your sinuses. Swelling can trap mucus in your sinuses. This lets germs (bacteria, virus, or fungus) grow, which leads to infection. Most of the time, this condition is caused by a virus.  What are the causes?  This condition is caused by:  · Allergies.  · Asthma.  · Germs.  · Things that block your nose or sinuses.  · Growths in the nose (nasal polyps).  · Chemicals or irritants in the air.  · Fungus (rare).  What increases the risk?  You are more likely to develop this condition if:  · You have a weak body defense system (immune system).  · You do a lot of swimming or diving.  · You use nasal sprays too much.  · You smoke.  What are the signs or symptoms?  The main symptoms of this condition are pain and a feeling of pressure around the sinuses. Other symptoms include:  · Stuffy nose (congestion).  · Runny nose (drainage).  · Swelling and warmth in the sinuses.  · Headache.  · Toothache.  · A cough that may get worse at night.  · Mucus that collects in the throat or the back of the nose (postnasal drip).  · Being unable to smell and taste.  · Being very tired (fatigue).  · A fever.  · Sore throat.  · Bad breath.  How is this diagnosed?  This condition is diagnosed based on:  · Your symptoms.  · Your medical history.  · A physical exam.  · Tests to find out if your condition is short-term (acute) or long-term (chronic). Your doctor may:  ? Check your nose for growths (polyps).  ? Check your sinuses using a tool that has a light (endoscope).  ? Check for allergies or germs.  ? Do imaging tests, such as an MRI or CT scan.  How is this treated?  Treatment for this condition  depends on the cause and whether it is short-term or long-term.  · If caused by a virus, your symptoms should go away on their own within 10 days. You may be given medicines to relieve symptoms. They include:  ? Medicines that shrink swollen tissue in the nose.  ? Medicines that treat allergies (antihistamines).  ? A spray that treats swelling of the nostrils.   ? Rinses that help get rid of thick mucus in your nose (nasal saline washes).  · If caused by bacteria, your doctor may wait to see if you will get better without treatment. You may be given antibiotic medicine if you have:  ? A very bad infection.  ? A weak body defense system.  · If caused by growths in the nose, you may need to have surgery.  Follow these instructions at home:  Medicines  · Take, use, or apply over-the-counter and prescription medicines only as told by your doctor. These may include nasal sprays.  · If you were prescribed an antibiotic medicine, take it as told by your doctor. Do not stop taking the antibiotic even if you start to feel better.  Hydrate and humidify    · Drink enough water to keep your pee (urine) pale yellow.  · Use a cool mist humidifier to keep the humidity level in your home above 50%.  · Breathe in steam for 10-15 minutes, 3-4 times a day, or as told by your doctor. You can do this in the bathroom while a hot shower is running.  · Try not to spend time in cool or dry air.  Rest  · Rest as much as you can.  · Sleep with your head raised (elevated).  · Make sure you get enough sleep each night.  General instructions    · Put a warm, moist washcloth on your face 3-4 times a day, or as often as told by your doctor. This will help with discomfort.  · Wash your hands often with soap and water. If there is no soap and water, use hand .  · Do not smoke. Avoid being around people who are smoking (secondhand smoke).  · Keep all follow-up visits as told by your doctor. This is important.  Contact a doctor if:  · You  have a fever.  · Your symptoms get worse.  · Your symptoms do not get better within 10 days.  Get help right away if:  · You have a very bad headache.  · You cannot stop throwing up (vomiting).  · You have very bad pain or swelling around your face or eyes.  · You have trouble seeing.  · You feel confused.  · Your neck is stiff.  · You have trouble breathing.  Summary  · Sinusitis is swelling of your sinuses. Sinuses are hollow spaces in the bones around your face.  · This condition is caused by tissues in your nose that become inflamed or swollen. This traps germs. These can lead to infection.  · If you were prescribed an antibiotic medicine, take it as told by your doctor. Do not stop taking it even if you start to feel better.  · Keep all follow-up visits as told by your doctor. This is important.  This information is not intended to replace advice given to you by your health care provider. Make sure you discuss any questions you have with your health care provider.  Document Released: 06/05/2009 Document Revised: 05/20/2019 Document Reviewed: 05/20/2019  [employer] Patient Education © 2020 [employer]

## 2020-03-17 ENCOUNTER — TELEPHONE (OUTPATIENT)
Dept: MAMMOGRAPHY | Facility: HOSPITAL | Age: 45
End: 2020-03-17

## 2021-06-25 DIAGNOSIS — M25.521 BILATERAL ELBOW JOINT PAIN: Primary | ICD-10-CM

## 2021-06-25 DIAGNOSIS — M25.522 BILATERAL ELBOW JOINT PAIN: Primary | ICD-10-CM

## 2021-06-28 ENCOUNTER — APPOINTMENT (OUTPATIENT)
Dept: GENERAL RADIOLOGY | Facility: HOSPITAL | Age: 46
End: 2021-06-28

## 2021-11-19 ENCOUNTER — APPOINTMENT (OUTPATIENT)
Dept: MAMMOGRAPHY | Facility: HOSPITAL | Age: 46
End: 2021-11-19

## 2022-02-15 ENCOUNTER — HOSPITAL ENCOUNTER (OUTPATIENT)
Dept: RESPIRATORY THERAPY | Facility: HOSPITAL | Age: 47
Discharge: HOME | End: 2022-02-15

## 2022-02-15 ENCOUNTER — OFFICE VISIT (OUTPATIENT)
Dept: FAMILY MEDICINE CLINIC | Facility: CLINIC | Age: 47
End: 2022-02-15

## 2022-02-15 ENCOUNTER — TRANSCRIBE ORDERS (OUTPATIENT)
Dept: FAMILY MEDICINE CLINIC | Facility: CLINIC | Age: 47
End: 2022-02-15

## 2022-02-15 VITALS
HEART RATE: 71 BPM | OXYGEN SATURATION: 96 % | DIASTOLIC BLOOD PRESSURE: 82 MMHG | RESPIRATION RATE: 18 BRPM | TEMPERATURE: 98.6 F | BODY MASS INDEX: 32.39 KG/M2 | SYSTOLIC BLOOD PRESSURE: 123 MMHG | WEIGHT: 165 LBS | HEIGHT: 60 IN

## 2022-02-15 DIAGNOSIS — R00.2 PALPITATIONS: ICD-10-CM

## 2022-02-15 DIAGNOSIS — Z13.220 SCREENING, LIPID: ICD-10-CM

## 2022-02-15 DIAGNOSIS — F41.9 ANXIETY: ICD-10-CM

## 2022-02-15 DIAGNOSIS — E03.9 HYPOTHYROIDISM, UNSPECIFIED TYPE: ICD-10-CM

## 2022-02-15 DIAGNOSIS — R00.2 PALPITATIONS: Primary | ICD-10-CM

## 2022-02-15 DIAGNOSIS — I73.9 PERIPHERAL ARTERY DISEASE: Primary | ICD-10-CM

## 2022-02-15 PROCEDURE — 84439 ASSAY OF FREE THYROXINE: CPT | Performed by: NURSE PRACTITIONER

## 2022-02-15 PROCEDURE — 85025 COMPLETE CBC W/AUTO DIFF WBC: CPT | Performed by: NURSE PRACTITIONER

## 2022-02-15 PROCEDURE — 99214 OFFICE O/P EST MOD 30 MIN: CPT | Performed by: NURSE PRACTITIONER

## 2022-02-15 PROCEDURE — 80053 COMPREHEN METABOLIC PANEL: CPT | Performed by: NURSE PRACTITIONER

## 2022-02-15 PROCEDURE — 93000 ELECTROCARDIOGRAM COMPLETE: CPT | Performed by: NURSE PRACTITIONER

## 2022-02-15 PROCEDURE — 84443 ASSAY THYROID STIM HORMONE: CPT | Performed by: NURSE PRACTITIONER

## 2022-02-15 PROCEDURE — 36415 COLL VENOUS BLD VENIPUNCTURE: CPT | Performed by: NURSE PRACTITIONER

## 2022-02-15 PROCEDURE — 80061 LIPID PANEL: CPT | Performed by: NURSE PRACTITIONER

## 2022-02-15 RX ORDER — HYDROXYZINE HYDROCHLORIDE 25 MG/1
25 TABLET, FILM COATED ORAL 3 TIMES DAILY PRN
Qty: 30 TABLET | Refills: 0 | Status: SHIPPED | OUTPATIENT
Start: 2022-02-15 | End: 2022-03-25

## 2022-02-15 NOTE — PROGRESS NOTES
Venipuncture Blood Specimen Collection  Venipuncture performed in right arm by Carolyn Grace MA with good hemostasis. Patient tolerated the procedure well without complications.   02/15/22   Carolyn Grace MA

## 2022-02-15 NOTE — PROGRESS NOTES
Subjective   Annalisa Whittington is a 46 y.o. female.     Chief Complaint   Patient presents with   • Hypertension     follow up after covid vaccine       She presents seeking a new primary care provider. She has been seeing Sharon Escobedo for hypothyroidism. She is also treated for anxiety and seasonal affective disorder per [facility]. She states she had the [employer] covid vaccine at the end of January. She had COVID twice that she knows of. She c/o arrhythmias at night. She states they last several hours. She c/o having some bradycardia. She checked her heart rate and it was 37. She c/o high blood pressure in just her right arm. She c/o shortness of breath. She states when she is laying there and hears the arrhythmia, she feels short of breath. She does have h/o second hand smoke exposure. She states she hadn't slept in days and she took one of her mom's metoprolol. She states it helped her sleep and it stopped the arrhythmia. She had an abnormal pap smear that led to a colposcopy that was normal.        The following portions of the patient's history were reviewed and updated as appropriate: allergies, current medications, past family history, past medical history, past social history, past surgical history and problem list.    Review of Systems   Constitutional: Negative for fatigue, fever and unexpected weight change.   HENT: Negative for ear pain, rhinorrhea and sore throat.    Eyes: Negative for visual disturbance.   Respiratory: Positive for shortness of breath. Negative for cough, chest tightness and wheezing.    Cardiovascular: Positive for chest pain. Negative for palpitations and leg swelling.   Gastrointestinal: Negative for abdominal pain, blood in stool, constipation, diarrhea, nausea and vomiting.   Endocrine: Negative for cold intolerance and heat intolerance.   Genitourinary: Negative for dysuria.   Musculoskeletal: Negative for arthralgias and myalgias.   Skin: Negative for color change.  "  Allergic/Immunologic: Positive for environmental allergies.   Neurological: Positive for headaches. Negative for dizziness.   Hematological: Negative for adenopathy.   Psychiatric/Behavioral: Positive for sleep disturbance. Negative for suicidal ideas. The patient is nervous/anxious.        Objective     /82 (BP Location: Left arm, Patient Position: Sitting, Cuff Size: Large Adult)   Pulse 71   Temp 98.6 °F (37 °C) (Temporal)   Resp 18   Ht 152.4 cm (60\")   Wt 74.8 kg (165 lb)   SpO2 96%   BMI 32.22 kg/m²     Physical Exam  Vitals reviewed.   Constitutional:       General: She is not in acute distress.     Appearance: Normal appearance. She is well-developed. She is not diaphoretic.   HENT:      Head: Normocephalic and atraumatic.      Right Ear: Hearing, tympanic membrane, ear canal and external ear normal.      Left Ear: Hearing, tympanic membrane, ear canal and external ear normal.      Nose: Nose normal.      Right Sinus: No maxillary sinus tenderness or frontal sinus tenderness.      Left Sinus: No maxillary sinus tenderness or frontal sinus tenderness.      Mouth/Throat:      Pharynx: Uvula midline.   Eyes:      General: Lids are normal.      Conjunctiva/sclera: Conjunctivae normal.   Neck:      Trachea: Trachea normal. No tracheal tenderness or tracheal deviation.   Cardiovascular:      Rate and Rhythm: Normal rate and regular rhythm.      Heart sounds: Normal heart sounds, S1 normal and S2 normal. No murmur heard.  No friction rub. No gallop.    Pulmonary:      Effort: Pulmonary effort is normal. No respiratory distress.      Breath sounds: Normal breath sounds.   Abdominal:      General: Bowel sounds are normal. There is no distension.      Palpations: Abdomen is soft.      Tenderness: There is no abdominal tenderness.   Lymphadenopathy:      Cervical: No cervical adenopathy.   Skin:     General: Skin is warm and dry.   Neurological:      Mental Status: She is alert and oriented to person, " place, and time.   Psychiatric:         Behavior: Behavior normal.         Thought Content: Thought content normal.         Judgment: Judgment normal.         Assessment/Plan     Problem List Items Addressed This Visit     None      Visit Diagnoses     Peripheral artery disease (HCC)    -  Primary    Relevant Orders    US Arterial Doppler Lower Extremity Bilateral    Doppler Arterial Multi Level Upper Extremity - Bilateral CAR    Hypothyroidism, unspecified type        Relevant Orders    TSH    T4, Free    Palpitations        Relevant Orders    Holter Monitor - 72 Hour Up To 15 Days    Comprehensive Metabolic Panel    CBC & Differential    Adult Transthoracic Echo Complete W/ Cont if Necessary Per Protocol    Screening, lipid        Relevant Orders    Lipid Panel    Anxiety        Relevant Medications    hydrOXYzine (ATARAX) 25 MG tablet          Plan: Get holter, echo, labs to look for cause of palpitations. Get arterial dopplers to look for cause of blood pressure differences. Blood pressure in right arm is 140/92, blood pressure in left arm is 123/82. Stop synthroid for now. I recommend you do not take metoprolol. If your heart rate was in the 30s, metoprolol can make it even lower and could lead to death. She verbalized an understanding. Hydroxyzine ordered to help her sleep. Follow up in one month and as needed.       Procedure     ECG 12 Lead    Date/Time: 2/15/2022 4:32 PM  Performed by: Mariah Kwan APRN  Authorized by: Mariah Kwan APRN   Comparison: not compared with previous ECG   Rhythm: sinus rhythm  Rate: normal  Conduction: conduction normal  ST Segments: ST segments normal  QRS axis: normal  Other: no other findings    Clinical impression: normal ECG             @Body mass index is 32.22 kg/m².              Understands disease processes and need for medications.  Understands reasons for urgent and emergent care.  Patient (& family) verbalized agreement for treatment plan.   Emotional  support and active listening provided.  Patient provided time to verbalize feelings.               This document has been electronically signed by ROCKY Thomas   February 15, 2022 16:09 EST

## 2022-02-16 LAB
ALBUMIN SERPL-MCNC: 4.5 G/DL (ref 3.5–5.2)
ALBUMIN/GLOB SERPL: 1.7 G/DL
ALP SERPL-CCNC: 79 U/L (ref 39–117)
ALT SERPL W P-5'-P-CCNC: 34 U/L (ref 1–33)
ANION GAP SERPL CALCULATED.3IONS-SCNC: 16 MMOL/L (ref 5–15)
AST SERPL-CCNC: 23 U/L (ref 1–32)
BASOPHILS # BLD AUTO: 0.06 10*3/MM3 (ref 0–0.2)
BASOPHILS NFR BLD AUTO: 0.9 % (ref 0–1.5)
BILIRUB SERPL-MCNC: 0.2 MG/DL (ref 0–1.2)
BUN SERPL-MCNC: 11 MG/DL (ref 6–20)
BUN/CREAT SERPL: 13.3 (ref 7–25)
CALCIUM SPEC-SCNC: 8.8 MG/DL (ref 8.6–10.5)
CHLORIDE SERPL-SCNC: 103 MMOL/L (ref 98–107)
CHOLEST SERPL-MCNC: 190 MG/DL (ref 0–200)
CO2 SERPL-SCNC: 19 MMOL/L (ref 22–29)
CREAT SERPL-MCNC: 0.83 MG/DL (ref 0.57–1)
DEPRECATED RDW RBC AUTO: 40.6 FL (ref 37–54)
EOSINOPHIL # BLD AUTO: 0.13 10*3/MM3 (ref 0–0.4)
EOSINOPHIL NFR BLD AUTO: 1.9 % (ref 0.3–6.2)
ERYTHROCYTE [DISTWIDTH] IN BLOOD BY AUTOMATED COUNT: 11.9 % (ref 12.3–15.4)
GFR SERPL CREATININE-BSD FRML MDRD: 75 ML/MIN/1.73
GLOBULIN UR ELPH-MCNC: 2.6 GM/DL
GLUCOSE SERPL-MCNC: 87 MG/DL (ref 65–99)
HCT VFR BLD AUTO: 38.2 % (ref 34–46.6)
HDLC SERPL-MCNC: 56 MG/DL (ref 40–60)
HGB BLD-MCNC: 13 G/DL (ref 12–15.9)
IMM GRANULOCYTES # BLD AUTO: 0.11 10*3/MM3 (ref 0–0.05)
IMM GRANULOCYTES NFR BLD AUTO: 1.6 % (ref 0–0.5)
LDLC SERPL CALC-MCNC: 114 MG/DL (ref 0–100)
LDLC/HDLC SERPL: 1.99 {RATIO}
LYMPHOCYTES # BLD AUTO: 2.05 10*3/MM3 (ref 0.7–3.1)
LYMPHOCYTES NFR BLD AUTO: 30.3 % (ref 19.6–45.3)
MCH RBC QN AUTO: 31.9 PG (ref 26.6–33)
MCHC RBC AUTO-ENTMCNC: 34 G/DL (ref 31.5–35.7)
MCV RBC AUTO: 93.9 FL (ref 79–97)
MONOCYTES # BLD AUTO: 0.56 10*3/MM3 (ref 0.1–0.9)
MONOCYTES NFR BLD AUTO: 8.3 % (ref 5–12)
NEUTROPHILS NFR BLD AUTO: 3.85 10*3/MM3 (ref 1.7–7)
NEUTROPHILS NFR BLD AUTO: 57 % (ref 42.7–76)
NRBC BLD AUTO-RTO: 0 /100 WBC (ref 0–0.2)
PLATELET # BLD AUTO: 340 10*3/MM3 (ref 140–450)
PMV BLD AUTO: 10.8 FL (ref 6–12)
POTASSIUM SERPL-SCNC: 4.2 MMOL/L (ref 3.5–5.2)
PROT SERPL-MCNC: 7.1 G/DL (ref 6–8.5)
RBC # BLD AUTO: 4.07 10*6/MM3 (ref 3.77–5.28)
SODIUM SERPL-SCNC: 138 MMOL/L (ref 136–145)
T4 FREE SERPL-MCNC: 1 NG/DL (ref 0.93–1.7)
TRIGL SERPL-MCNC: 113 MG/DL (ref 0–150)
TSH SERPL DL<=0.05 MIU/L-ACNC: 2.2 UIU/ML (ref 0.27–4.2)
VLDLC SERPL-MCNC: 20 MG/DL (ref 5–40)
WBC NRBC COR # BLD: 6.76 10*3/MM3 (ref 3.4–10.8)

## 2022-02-17 ENCOUNTER — PATIENT ROUNDING (BHMG ONLY) (OUTPATIENT)
Dept: FAMILY MEDICINE CLINIC | Facility: CLINIC | Age: 47
End: 2022-02-17

## 2022-02-17 NOTE — PROGRESS NOTES
February 17, 2022    Hello, may I speak with Annalisa Whittington?    My name is Arielle     I am  with [facility]  [address] [place] [id number] [phone].    Before we get started may I verify your date of birth? 1975    I am calling to officially welcome you to our practice and ask about your recent visit. Is this a good time to talk? Yes     Tell me about your visit with us. What things went well?  Check in and out went smoothly and very efficient they guided her well thru the process and was extremely friendly.. Patient stated the facility was clean and comfortable. She was happy with Mariah and her knowledge. Provider was able to get her Holter monitor same day which patient appreciated greatly. Medical assistant who triaged her was nice but could have engaged more seemed to be pre occupied.        We're always looking for ways to make our patients' experiences even better. Do you have recommendations on ways we may improve?  no, other than previous comment     Overall were you satisfied with your first visit to our practice? yes       I appreciate you taking the time to speak with me today. Is there anything else I can do for you? No was just waiting on some test. Checked and noted that they were pending approval on one test and then would call notified patient of status update.       Thank you, and have a great day.

## 2022-02-18 DIAGNOSIS — I73.9 PERIPHERAL ARTERY DISEASE: Primary | ICD-10-CM

## 2022-03-07 ENCOUNTER — APPOINTMENT (OUTPATIENT)
Dept: CARDIOLOGY | Facility: HOSPITAL | Age: 47
End: 2022-03-07

## 2022-03-09 ENCOUNTER — APPOINTMENT (OUTPATIENT)
Dept: CARDIOLOGY | Facility: HOSPITAL | Age: 47
End: 2022-03-09

## 2022-03-23 ENCOUNTER — TELEMEDICINE (OUTPATIENT)
Dept: FAMILY MEDICINE CLINIC | Facility: CLINIC | Age: 47
End: 2022-03-23

## 2022-03-23 ENCOUNTER — TELEPHONE (OUTPATIENT)
Dept: FAMILY MEDICINE CLINIC | Facility: CLINIC | Age: 47
End: 2022-03-23

## 2022-03-23 DIAGNOSIS — E03.9 HYPOTHYROIDISM, UNSPECIFIED TYPE: Primary | Chronic | ICD-10-CM

## 2022-03-23 DIAGNOSIS — R00.2 PALPITATIONS: ICD-10-CM

## 2022-03-23 PROCEDURE — 99213 OFFICE O/P EST LOW 20 MIN: CPT | Performed by: NURSE PRACTITIONER

## 2022-03-23 NOTE — PROGRESS NOTES
Piper Whittington is a 46 y.o. female.     Chief Complaint   Patient presents with   • Palpitations       She presents via video visit changed to telephone visit due to audio difficulties for follow up of shortness of breath, palpitations, differing blood pressures in each arm. She states she decreased the levothyroxine and started the hydroxyzine. She states she has not had any more palpitations or shortness of breath. She states the differing blood pressures in her arms resolved. She canceled the echo and the arterial doppler.        The following portions of the patient's history were reviewed and updated as appropriate: allergies, current medications, past family history, past medical history, past social history, past surgical history and problem list.    Review of Systems   Constitutional: Negative for fatigue, fever and unexpected weight change.   HENT: Negative for ear pain, rhinorrhea and sore throat.    Eyes: Negative for visual disturbance.   Respiratory: Negative for cough, chest tightness and shortness of breath.    Cardiovascular: Negative for chest pain, palpitations and leg swelling.   Gastrointestinal: Negative for abdominal pain, blood in stool, constipation, diarrhea, nausea and vomiting.   Endocrine: Negative for cold intolerance and heat intolerance.   Genitourinary: Negative for dysuria.   Musculoskeletal: Negative for arthralgias and myalgias.   Skin: Negative for color change.   Allergic/Immunologic: Negative for environmental allergies.   Hematological: Negative for adenopathy.   Psychiatric/Behavioral: Negative for suicidal ideas. The patient is not nervous/anxious.        Objective     There were no vitals taken for this visit.    Physical Exam  Constitutional:       Appearance: Normal appearance.   HENT:      Head: Normocephalic and atraumatic.   Pulmonary:      Effort: Pulmonary effort is normal.   Neurological:      General: No focal deficit present.      Mental Status: She is  alert and oriented to person, place, and time.   Psychiatric:         Mood and Affect: Mood normal.         Behavior: Behavior normal.         Current Outpatient Medications   Medication Sig Dispense Refill   • busPIRone (BUSPAR) 10 MG tablet Take 10 mg by mouth 2 (Two) Times a Day.     • FAMOTIDINE PO Take  by mouth.     • FLUoxetine (PROzac) 40 MG capsule Take 40 mg by mouth Every Morning.  3   • hydrOXYzine (ATARAX) 25 MG tablet Take 1 tablet by mouth 3 (Three) Times a Day As Needed for Anxiety. 30 tablet 0   • loratadine (CLARITIN) 10 MG tablet Take 10 mg by mouth Daily.     • Multiple Vitamins-Minerals (CENTRUM ADULTS PO) Take  by mouth.       No current facility-administered medications for this visit.       Meds Ordered During Visit  No orders of the defined types were placed in this encounter.         Assessment/Plan     Problem List Items Addressed This Visit    None     Visit Diagnoses     Hypothyroidism, unspecified type  (Chronic)   -  Primary    Palpitations                ICD-10-CM ICD-9-CM   1. Hypothyroidism, unspecified type  E03.9 244.9   2. Palpitations  R00.2 785.1       Plan: holter report not available. Obtain report and call with results. CBC, CMP, TSH normal. Ldl elevated. Try a low fat diet and exercise. Follow up in 3 months and as needed.     @There is no height or weight on file to calculate BMI.           Understands disease processes and need for medications.  Understands reasons for urgent and emergent care.  Patient (& family) verbalized agreement for treatment plan.   Emotional support and active listening provided.  Patient provided time to verbalize feelings.               This document has been electronically signed by ROCKY Thomas   March 23, 2022 15:22 EDT

## 2022-03-23 NOTE — TELEPHONE ENCOUNTER
Caller: Annalisa Whittington    Relationship: Self    Best call back number: [phone]    What is the best time to reach you: ANYTIME    Who are you requesting to speak with (clinical staff, provider,  specific staff member): CLINICAL STAFF    Do you know the name of the person who called: SELF    What was the call regarding: PATIENT STATES THAT SHE IS RETUNING A CALL TO SOMEONE IN THE OFFICE.    Do you require a callback: YES

## 2022-03-24 DIAGNOSIS — F41.9 ANXIETY: ICD-10-CM

## 2022-03-24 NOTE — TELEPHONE ENCOUNTER
Unable to reach the patient by phone no answer. Called the patient to see if she mailed her Holter monitor

## 2022-03-25 RX ORDER — HYDROXYZINE HYDROCHLORIDE 25 MG/1
TABLET, FILM COATED ORAL
Qty: 30 TABLET | Refills: 0 | Status: SHIPPED | OUTPATIENT
Start: 2022-03-25 | End: 2022-06-21 | Stop reason: SDUPTHER

## 2022-03-25 NOTE — TELEPHONE ENCOUNTER
Marylu from respiratory called back and stated they have not received the results and cant tell if the company has either.

## 2022-03-25 NOTE — TELEPHONE ENCOUNTER
Called [facility] requested they have supervisor to call and check on status of Holter monitor and they took my number and will give me a call back

## 2022-03-25 NOTE — TELEPHONE ENCOUNTER
Rhina at the hospital called and reported the company stated there was no data on the monitor. The patient will need to redo testing if desired. Notified Mariah Kwan of findings. Called patient [phone] left message to return call.

## 2022-03-25 NOTE — TELEPHONE ENCOUNTER
Will you call and tell the hospital she mailed the holter monitor back and see if we can get the results?

## 2022-04-01 NOTE — TELEPHONE ENCOUNTER
Called patient [phone] left message to return call    Unable to reach patient please advise on what to do from here regarding care.

## 2022-06-14 ENCOUNTER — TELEPHONE (OUTPATIENT)
Dept: FAMILY MEDICINE CLINIC | Facility: CLINIC | Age: 47
End: 2022-06-14

## 2022-06-14 NOTE — TELEPHONE ENCOUNTER
What biopsy? She should return to the person who did the biopsy. Otherwise she should come for a visit.

## 2022-06-14 NOTE — TELEPHONE ENCOUNTER
Caller: EZRA YBARRA     Relationship:     Best call back number: [phone]    What is your medical concern? WIFE BLEEDING FROM BIOPSY     How long has this issue been going on? SINCE 11/2021     Is your provider already aware of this issue? UNKNOWN     Have you been treated for this issue? UNKNOWN

## 2022-06-21 ENCOUNTER — OFFICE VISIT (OUTPATIENT)
Dept: FAMILY MEDICINE CLINIC | Facility: CLINIC | Age: 47
End: 2022-06-21

## 2022-06-21 VITALS
HEIGHT: 60 IN | RESPIRATION RATE: 18 BRPM | DIASTOLIC BLOOD PRESSURE: 88 MMHG | BODY MASS INDEX: 31.61 KG/M2 | TEMPERATURE: 98.6 F | SYSTOLIC BLOOD PRESSURE: 130 MMHG | OXYGEN SATURATION: 99 % | HEART RATE: 73 BPM | WEIGHT: 161 LBS

## 2022-06-21 DIAGNOSIS — Z13.220 SCREENING, LIPID: ICD-10-CM

## 2022-06-21 DIAGNOSIS — E03.9 HYPOTHYROIDISM, UNSPECIFIED TYPE: Primary | Chronic | ICD-10-CM

## 2022-06-21 DIAGNOSIS — F41.9 ANXIETY: Chronic | ICD-10-CM

## 2022-06-21 PROCEDURE — 84481 FREE ASSAY (FT-3): CPT | Performed by: NURSE PRACTITIONER

## 2022-06-21 PROCEDURE — 84439 ASSAY OF FREE THYROXINE: CPT | Performed by: NURSE PRACTITIONER

## 2022-06-21 PROCEDURE — 80061 LIPID PANEL: CPT | Performed by: NURSE PRACTITIONER

## 2022-06-21 PROCEDURE — 80050 GENERAL HEALTH PANEL: CPT | Performed by: NURSE PRACTITIONER

## 2022-06-21 PROCEDURE — 99214 OFFICE O/P EST MOD 30 MIN: CPT | Performed by: NURSE PRACTITIONER

## 2022-06-21 RX ORDER — LEVOTHYROXINE SODIUM 0.03 MG/1
25 TABLET ORAL DAILY
Qty: 30 TABLET | Refills: 0 | Status: SHIPPED | OUTPATIENT
Start: 2022-06-21 | End: 2022-07-27 | Stop reason: SDUPTHER

## 2022-06-21 RX ORDER — HYDROXYZINE HYDROCHLORIDE 25 MG/1
25 TABLET, FILM COATED ORAL 3 TIMES DAILY PRN
Qty: 30 TABLET | Refills: 5 | Status: SHIPPED | OUTPATIENT
Start: 2022-06-21

## 2022-06-21 NOTE — PROGRESS NOTES
"Piper Whittington is a 46 y.o. female.     Chief Complaint   Patient presents with   • Follow-up       She presents for follow up of hypothyroidism. She states she has been taking levothyroxine 25mcg daily. She c/o a runny nose and states she is having a hard time sleeping. She states the hydroxyzine helps her relax. She c/o occasional vaginal spotting if she strains to have a bowel movement or if she coughs a lot. She had a colposcopy that reports low grade squamous epithelial lesion.       The following portions of the patient's history were reviewed and updated as appropriate: allergies, current medications, past family history, past medical history, past social history, past surgical history and problem list.    Review of Systems   Constitutional: Negative for fatigue, fever and unexpected weight change.   HENT: Positive for rhinorrhea. Negative for ear pain and sore throat.    Eyes: Negative for visual disturbance.   Respiratory: Negative for cough, chest tightness and shortness of breath.    Cardiovascular: Positive for palpitations (on ocasion). Negative for chest pain and leg swelling.   Gastrointestinal: Negative for abdominal pain, blood in stool, constipation, diarrhea, nausea and vomiting.   Endocrine: Negative for cold intolerance and heat intolerance.   Genitourinary: Negative for dysuria and hematuria.   Musculoskeletal: Negative for arthralgias and myalgias.   Skin: Negative for color change.   Allergic/Immunologic: Positive for environmental allergies.   Hematological: Negative for adenopathy.   Psychiatric/Behavioral: Negative for suicidal ideas. The patient is nervous/anxious.        Objective     /88 (BP Location: Left arm, Patient Position: Sitting, Cuff Size: Adult)   Pulse 73   Temp 98.6 °F (37 °C) (Temporal)   Resp 18   Ht 152.4 cm (60\")   Wt 73 kg (161 lb)   SpO2 99%   BMI 31.44 kg/m²     Physical Exam  Vitals reviewed.   Constitutional:       General: She is not in acute " distress.     Appearance: Normal appearance. She is well-developed. She is not diaphoretic.   HENT:      Head: Normocephalic and atraumatic.   Cardiovascular:      Rate and Rhythm: Normal rate and regular rhythm.      Heart sounds: Normal heart sounds, S1 normal and S2 normal. No murmur heard.    No friction rub. No gallop.   Pulmonary:      Effort: Pulmonary effort is normal. No respiratory distress.      Breath sounds: Normal breath sounds.   Abdominal:      General: Bowel sounds are normal. There is no distension.      Palpations: Abdomen is soft.      Tenderness: There is no abdominal tenderness.   Skin:     General: Skin is warm and dry.   Neurological:      Mental Status: She is alert and oriented to person, place, and time.   Psychiatric:         Behavior: Behavior normal.         Thought Content: Thought content normal.         Judgment: Judgment normal.         Current Outpatient Medications   Medication Sig Dispense Refill   • busPIRone (BUSPAR) 10 MG tablet Take 10 mg by mouth 2 (Two) Times a Day.     • FAMOTIDINE PO Take  by mouth.     • FLUoxetine (PROzac) 40 MG capsule Take 40 mg by mouth Every Morning.  3   • hydrOXYzine (ATARAX) 25 MG tablet Take 1 tablet by mouth 3 (Three) Times a Day As Needed for Anxiety. for anxiety 30 tablet 5   • loratadine (CLARITIN) 10 MG tablet Take 10 mg by mouth Daily.     • Multiple Vitamins-Minerals (CENTRUM ADULTS PO) Take  by mouth.     • levothyroxine (SYNTHROID, LEVOTHROID) 25 MCG tablet Take 1 tablet by mouth Daily. 30 tablet 0     No current facility-administered medications for this visit.            Assessment & Plan     Problem List Items Addressed This Visit    None     Visit Diagnoses     Hypothyroidism, unspecified type    -  Primary    Relevant Medications    levothyroxine (SYNTHROID, LEVOTHROID) 25 MCG tablet    Other Relevant Orders    CBC & Differential    Comprehensive Metabolic Panel    TSH    T4, Free    T3, free    Screening, lipid        Relevant  Orders    Lipid Panel    Anxiety        Relevant Medications    hydrOXYzine (ATARAX) 25 MG tablet            ICD-10-CM ICD-9-CM   1. Hypothyroidism, unspecified type  E03.9 244.9   2. Screening, lipid  Z13.220 V77.91   3. Anxiety  F41.9 300.00       Plan: Get labs today. Continue current meds. I recommend she have a follow up pap smear. Follow up in 6 months and as needed.    @Body mass index is 31.44 kg/m².                Understands disease processes and need for medications.  Understands reasons for urgent and emergent care.  Patient (& family) verbalized agreement for treatment plan.   Emotional support and active listening provided.  Patient provided time to verbalize feelings.           BMI is >= 30 and <35. (Class 1 Obesity). The following options were offered after discussion;: weight loss educational material (shared in after visit summary)      This document has been electronically signed by ROCKY Thomas   June 21, 2022 16:17 EDT

## 2022-06-21 NOTE — PROGRESS NOTES
Venipuncture Blood Specimen Collection  Venipuncture performed in right arm by Carolyn Grace MA with good hemostasis. Patient tolerated the procedure well without complications.   06/21/22   Carolyn Grace MA

## 2022-06-22 ENCOUNTER — TELEPHONE (OUTPATIENT)
Dept: FAMILY MEDICINE CLINIC | Facility: CLINIC | Age: 47
End: 2022-06-22

## 2022-06-22 LAB
ALBUMIN SERPL-MCNC: 4.4 G/DL (ref 3.5–5.2)
ALBUMIN/GLOB SERPL: 2 G/DL
ALP SERPL-CCNC: 76 U/L (ref 39–117)
ALT SERPL W P-5'-P-CCNC: 29 U/L (ref 1–33)
ANION GAP SERPL CALCULATED.3IONS-SCNC: 12 MMOL/L (ref 5–15)
AST SERPL-CCNC: 23 U/L (ref 1–32)
BASOPHILS # BLD AUTO: 0.06 10*3/MM3 (ref 0–0.2)
BASOPHILS NFR BLD AUTO: 0.6 % (ref 0–1.5)
BILIRUB SERPL-MCNC: 0.4 MG/DL (ref 0–1.2)
BUN SERPL-MCNC: 11 MG/DL (ref 6–20)
BUN/CREAT SERPL: 13.3 (ref 7–25)
CALCIUM SPEC-SCNC: 9.2 MG/DL (ref 8.6–10.5)
CHLORIDE SERPL-SCNC: 104 MMOL/L (ref 98–107)
CHOLEST SERPL-MCNC: 153 MG/DL (ref 0–200)
CO2 SERPL-SCNC: 21 MMOL/L (ref 22–29)
CREAT SERPL-MCNC: 0.83 MG/DL (ref 0.57–1)
DEPRECATED RDW RBC AUTO: 41.2 FL (ref 37–54)
EGFRCR SERPLBLD CKD-EPI 2021: 89.3 ML/MIN/1.73
EOSINOPHIL # BLD AUTO: 0.13 10*3/MM3 (ref 0–0.4)
EOSINOPHIL NFR BLD AUTO: 1.4 % (ref 0.3–6.2)
ERYTHROCYTE [DISTWIDTH] IN BLOOD BY AUTOMATED COUNT: 12.1 % (ref 12.3–15.4)
GLOBULIN UR ELPH-MCNC: 2.2 GM/DL
GLUCOSE SERPL-MCNC: 79 MG/DL (ref 65–99)
HCT VFR BLD AUTO: 35.9 % (ref 34–46.6)
HDLC SERPL-MCNC: 48 MG/DL (ref 40–60)
HGB BLD-MCNC: 12.6 G/DL (ref 12–15.9)
IMM GRANULOCYTES # BLD AUTO: 0.1 10*3/MM3 (ref 0–0.05)
IMM GRANULOCYTES NFR BLD AUTO: 1.1 % (ref 0–0.5)
LDLC SERPL CALC-MCNC: 86 MG/DL (ref 0–100)
LDLC/HDLC SERPL: 1.76 {RATIO}
LYMPHOCYTES # BLD AUTO: 1.93 10*3/MM3 (ref 0.7–3.1)
LYMPHOCYTES NFR BLD AUTO: 20.7 % (ref 19.6–45.3)
MCH RBC QN AUTO: 32.8 PG (ref 26.6–33)
MCHC RBC AUTO-ENTMCNC: 35.1 G/DL (ref 31.5–35.7)
MCV RBC AUTO: 93.5 FL (ref 79–97)
MONOCYTES # BLD AUTO: 0.76 10*3/MM3 (ref 0.1–0.9)
MONOCYTES NFR BLD AUTO: 8.2 % (ref 5–12)
NEUTROPHILS NFR BLD AUTO: 6.34 10*3/MM3 (ref 1.7–7)
NEUTROPHILS NFR BLD AUTO: 68 % (ref 42.7–76)
NRBC BLD AUTO-RTO: 0 /100 WBC (ref 0–0.2)
PLATELET # BLD AUTO: 272 10*3/MM3 (ref 140–450)
PMV BLD AUTO: 11.6 FL (ref 6–12)
POTASSIUM SERPL-SCNC: 4.1 MMOL/L (ref 3.5–5.2)
PROT SERPL-MCNC: 6.6 G/DL (ref 6–8.5)
RBC # BLD AUTO: 3.84 10*6/MM3 (ref 3.77–5.28)
SODIUM SERPL-SCNC: 137 MMOL/L (ref 136–145)
T3FREE SERPL-MCNC: 2.65 PG/ML (ref 2–4.4)
T4 FREE SERPL-MCNC: 1.18 NG/DL (ref 0.93–1.7)
TRIGL SERPL-MCNC: 102 MG/DL (ref 0–150)
TSH SERPL DL<=0.05 MIU/L-ACNC: 2.41 UIU/ML (ref 0.27–4.2)
VLDLC SERPL-MCNC: 19 MG/DL (ref 5–40)
WBC NRBC COR # BLD: 9.32 10*3/MM3 (ref 3.4–10.8)

## 2022-06-22 NOTE — TELEPHONE ENCOUNTER
Caller: EZRA YBARRA    Relationship: Emergency Contact    Best call back number: [phone]    What is the best time to reach you: NA    Who are you requesting to speak with (clinical staff, provider,  specific staff member): NA    Do you know the name of the person who called: NA    What was the call regarding: PATIENTS CALLED TO FOLLOW UP ON MEDICATION REQUEST    Do you require a callback: YES

## 2022-06-22 NOTE — TELEPHONE ENCOUNTER
Caller: Annalisa Whittington    Relationship: Self    Best call back number: [phone]    What medication are you requesting: PCP DISCRETION    What are your current symptoms: SINUS PRESSURE, PRODUCTIVE COUGH,  GREEN  MUCUS WITH SOME BLOOD,  NASAL DRAINAGE    How long have you been experiencing symptoms: 3 DAYS    Have you had these symptoms before:    [x] Yes  [] No    Have you been treated for these symptoms before:   [x] Yes  [] No    If a prescription is needed, what is your preferred pharmacy and phone number:      [address] - [phone] [facility] [phone]             Additional notes:

## 2022-06-24 ENCOUNTER — TELEPHONE (OUTPATIENT)
Dept: FAMILY MEDICINE CLINIC | Facility: CLINIC | Age: 47
End: 2022-06-24

## 2022-06-24 RX ORDER — AMOXICILLIN AND CLAVULANATE POTASSIUM 875; 125 MG/1; MG/1
1 TABLET, FILM COATED ORAL 2 TIMES DAILY
Qty: 20 TABLET | Refills: 0 | Status: SHIPPED | OUTPATIENT
Start: 2022-06-24 | End: 2022-07-04

## 2022-06-24 NOTE — TELEPHONE ENCOUNTER
I will send in Augmentin to take twice daily for 10 days with food for sinusitis.  If this does not improve patient's symptoms, she will need to be reevaluated in the office.

## 2022-06-24 NOTE — TELEPHONE ENCOUNTER
Patient was seen I office for cough and sinus issues, patient is not getting any better and she requested to see if you could send an antibiotic to pharmacy for her.

## 2022-07-27 DIAGNOSIS — E03.9 HYPOTHYROIDISM, UNSPECIFIED TYPE: Chronic | ICD-10-CM

## 2022-07-27 RX ORDER — LEVOTHYROXINE SODIUM 0.03 MG/1
25 TABLET ORAL DAILY
Qty: 30 TABLET | Refills: 0 | Status: SHIPPED | OUTPATIENT
Start: 2022-07-27 | End: 2022-09-08 | Stop reason: SDUPTHER

## 2022-09-08 DIAGNOSIS — E03.9 HYPOTHYROIDISM, UNSPECIFIED TYPE: Chronic | ICD-10-CM

## 2022-09-08 NOTE — TELEPHONE ENCOUNTER
Caller: EZRA YBARRA    Relationship: Emergency Contact    Best call back number: [phone]    Requested Prescriptions:   Requested Prescriptions     Pending Prescriptions Disp Refills   • levothyroxine (SYNTHROID, LEVOTHROID) 25 MCG tablet 30 tablet 0     Sig: Take 1 tablet by mouth Daily.   • FLUoxetine (PROzac) 40 MG capsule  3     Sig: Take 1 capsule by mouth Every Morning.        Pharmacy where request should be sent: [facility] [address] [place] [address] - [phone] [facility] [phone] FX     Additional details provided by patient: PATIENT IS OUT OF MEDICATION. REQUESTING 90 DAY SUPPLY     Does the patient have less than a 3 day supply:  [] Yes  [x] No    Lore Black   09/08/22 16:21 EDT

## 2022-09-09 RX ORDER — LEVOTHYROXINE SODIUM 0.03 MG/1
25 TABLET ORAL DAILY
Qty: 30 TABLET | Refills: 2 | Status: SHIPPED | OUTPATIENT
Start: 2022-09-09 | End: 2022-12-08 | Stop reason: SDUPTHER

## 2022-09-09 RX ORDER — FLUOXETINE HYDROCHLORIDE 40 MG/1
40 CAPSULE ORAL EVERY MORNING
Qty: 30 CAPSULE | Refills: 3 | Status: SHIPPED | OUTPATIENT
Start: 2022-09-09

## 2022-11-18 DIAGNOSIS — Z12.31 SCREENING MAMMOGRAM FOR BREAST CANCER: Primary | ICD-10-CM

## 2022-12-06 ENCOUNTER — OFFICE VISIT (OUTPATIENT)
Dept: FAMILY MEDICINE CLINIC | Facility: CLINIC | Age: 47
End: 2022-12-06

## 2022-12-06 VITALS
OXYGEN SATURATION: 100 % | RESPIRATION RATE: 18 BRPM | WEIGHT: 164 LBS | BODY MASS INDEX: 32.2 KG/M2 | DIASTOLIC BLOOD PRESSURE: 90 MMHG | HEIGHT: 60 IN | HEART RATE: 89 BPM | SYSTOLIC BLOOD PRESSURE: 120 MMHG | TEMPERATURE: 98.4 F

## 2022-12-06 DIAGNOSIS — E03.9 HYPOTHYROIDISM, UNSPECIFIED TYPE: Primary | Chronic | ICD-10-CM

## 2022-12-06 DIAGNOSIS — J30.2 SEASONAL ALLERGIES: ICD-10-CM

## 2022-12-06 DIAGNOSIS — Z12.4 SCREENING FOR CERVICAL CANCER: ICD-10-CM

## 2022-12-06 DIAGNOSIS — Z13.220 SCREENING, LIPID: ICD-10-CM

## 2022-12-06 PROCEDURE — 80061 LIPID PANEL: CPT | Performed by: NURSE PRACTITIONER

## 2022-12-06 PROCEDURE — 3015F CERV CANCER SCREEN DOCD: CPT | Performed by: NURSE PRACTITIONER

## 2022-12-06 PROCEDURE — 36415 COLL VENOUS BLD VENIPUNCTURE: CPT | Performed by: NURSE PRACTITIONER

## 2022-12-06 PROCEDURE — 80050 GENERAL HEALTH PANEL: CPT | Performed by: NURSE PRACTITIONER

## 2022-12-06 PROCEDURE — 99396 PREV VISIT EST AGE 40-64: CPT | Performed by: NURSE PRACTITIONER

## 2022-12-06 RX ORDER — FEXOFENADINE HCL 180 MG/1
180 TABLET ORAL DAILY
Qty: 90 TABLET | Refills: 1 | Status: SHIPPED | OUTPATIENT
Start: 2022-12-06

## 2022-12-06 RX ORDER — FLUTICASONE PROPIONATE 50 MCG
2 SPRAY, SUSPENSION (ML) NASAL DAILY
Qty: 15.8 ML | Refills: 5 | Status: SHIPPED | OUTPATIENT
Start: 2022-12-06

## 2022-12-06 NOTE — PROGRESS NOTES
Venipuncture Blood Specimen Collection  Venipuncture performed in right arm by Carolyn Grace MA with good hemostasis. Patient tolerated the procedure well without complications.   12/06/22   Carolyn Grace MA

## 2022-12-06 NOTE — PROGRESS NOTES
"Piper Whittington is a 47 y.o. female.     Chief Complaint   Patient presents with   • Gynecologic Exam       History of Present Illness  She presents for her routine pap smear. Her last pap was this time last year. She states her periods are regular. She states they are getting closer together. She is . She does self breast exams. She c/o green/yellow sinus drainage since the first.        The following portions of the patient's history were reviewed and updated as appropriate: allergies, current medications, past family history, past medical history, past social history, past surgical history and problem list.    Review of Systems   Constitutional: Negative for fatigue, fever and unexpected weight change.   HENT: Positive for postnasal drip and rhinorrhea. Negative for ear pain and sore throat.    Respiratory: Negative for cough, shortness of breath and wheezing.    Cardiovascular: Negative for chest pain and palpitations.   Gastrointestinal: Negative for abdominal pain, blood in stool, constipation, diarrhea, nausea and vomiting.   Genitourinary: Negative for dysuria and hematuria.   Allergic/Immunologic: Positive for environmental allergies.   Neurological: Negative for dizziness, seizures, syncope and headaches.   Hematological: Negative for adenopathy.   Psychiatric/Behavioral: Negative for suicidal ideas. The patient is not nervous/anxious.        Objective     /90 (BP Location: Left arm, Patient Position: Sitting, Cuff Size: Adult)   Pulse 89   Temp 98.4 °F (36.9 °C) (Temporal)   Resp 18   Ht 152.4 cm (60\")   Wt 74.4 kg (164 lb)   SpO2 100%   BMI 32.03 kg/m²     Physical Exam  Vitals reviewed. Exam conducted with a chaperone present.   Constitutional:       General: She is not in acute distress.     Appearance: Normal appearance. She is well-developed. She is not diaphoretic.   HENT:      Head: Normocephalic and atraumatic.      Right Ear: Hearing, tympanic membrane, ear canal and " external ear normal.      Left Ear: Hearing, tympanic membrane, ear canal and external ear normal.      Nose: Nose normal.      Right Sinus: No maxillary sinus tenderness or frontal sinus tenderness.      Left Sinus: No maxillary sinus tenderness or frontal sinus tenderness.      Mouth/Throat:      Pharynx: Uvula midline.   Eyes:      General: Lids are normal.      Conjunctiva/sclera: Conjunctivae normal.   Neck:      Trachea: Trachea normal. No tracheal tenderness or tracheal deviation.   Cardiovascular:      Rate and Rhythm: Normal rate and regular rhythm.      Heart sounds: Normal heart sounds, S1 normal and S2 normal. No murmur heard.    No friction rub. No gallop.   Pulmonary:      Effort: Pulmonary effort is normal. No respiratory distress.      Breath sounds: Normal breath sounds.   Chest:      Chest wall: No mass, lacerations, deformity, swelling, tenderness, crepitus or edema. There is no dullness to percussion.   Breasts:     Shahram Score is 5.      Breasts are symmetrical.      Right: Normal. No swelling, bleeding, inverted nipple, mass, nipple discharge, skin change or tenderness.      Left: Normal. No swelling, bleeding, inverted nipple, mass, nipple discharge, skin change or tenderness.      Comments: Sj Grace MA  Abdominal:      General: Bowel sounds are normal. There is no distension.      Palpations: Abdomen is soft.      Tenderness: There is no abdominal tenderness.      Hernia: There is no hernia in the left inguinal area or right inguinal area.   Genitourinary:     General: Normal vulva.      Exam position: Lithotomy position.      Pubic Area: No rash or pubic lice.       Shahram stage (genital): 5.      Labia:         Right: No rash, tenderness, lesion or injury.         Left: No rash, tenderness, lesion or injury.       Urethra: No prolapse, urethral pain, urethral swelling or urethral lesion.      Vagina: Normal.      Cervix: Normal.      Uterus: Normal.       Adnexa: Right  adnexa normal and left adnexa normal.      Rectum: Normal.      Comments: Chaperone Carolyn Grace MA  Lymphadenopathy:      Cervical: No cervical adenopathy.      Upper Body:      Right upper body: No supraclavicular, axillary or pectoral adenopathy.      Left upper body: No supraclavicular, axillary or pectoral adenopathy.      Lower Body: No right inguinal adenopathy. No left inguinal adenopathy.   Skin:     General: Skin is warm and dry.   Neurological:      Mental Status: She is alert and oriented to person, place, and time.   Psychiatric:         Behavior: Behavior normal.         Thought Content: Thought content normal.         Judgment: Judgment normal.         Current Outpatient Medications   Medication Sig Dispense Refill   • busPIRone (BUSPAR) 10 MG tablet Take 10 mg by mouth 2 (Two) Times a Day.     • FLUoxetine (PROzac) 40 MG capsule Take 1 capsule by mouth Every Morning. 30 capsule 3   • hydrOXYzine (ATARAX) 25 MG tablet Take 1 tablet by mouth 3 (Three) Times a Day As Needed for Anxiety. for anxiety 30 tablet 5   • levothyroxine (SYNTHROID, LEVOTHROID) 25 MCG tablet Take 1 tablet by mouth Daily. 30 tablet 2   • Multiple Vitamins-Minerals (CENTRUM ADULTS PO) Take  by mouth.     • fexofenadine (Allegra Allergy) 180 MG tablet Take 1 tablet by mouth Daily. 90 tablet 1   • fluticasone (Flonase) 50 MCG/ACT nasal spray 2 sprays into the nostril(s) as directed by provider Daily. 15.8 mL 5     No current facility-administered medications for this visit.            Assessment & Plan     Problem List Items Addressed This Visit    None  Visit Diagnoses     Hypothyroidism, unspecified type    -  Primary    Relevant Orders    CBC & Differential    Comprehensive Metabolic Panel    TSH    Screening, lipid        Relevant Orders    Lipid Panel    Seasonal allergies        Relevant Medications    fluticasone (Flonase) 50 MCG/ACT nasal spray    fexofenadine (Allegra Allergy) 180 MG tablet    Screening for cervical cancer         Relevant Orders    IGP, Rfx Aptima HPV ASCU            ICD-10-CM ICD-9-CM   1. Hypothyroidism, unspecified type  E03.9 244.9   2. Screening, lipid  Z13.220 V77.91   3. Seasonal allergies  J30.2 477.9   4. Screening for cervical cancer  Z12.4 V76.2       Plan: Get labs today. Pap performed. Patient tolerated well. Counseled on self breast exams. Allegra ordered for allergies. Flonase added for allergies. Follow up in 6 months and as needed.     @Body mass index is 32.03 kg/m².                Understands disease processes and need for medications.  Understands reasons for urgent and emergent care.  Patient (& family) verbalized agreement for treatment plan.   Emotional support and active listening provided.  Patient provided time to verbalize feelings.           BMI is >= 30 and <35. (Class 1 Obesity). The following options were offered after discussion;: weight loss educational material (shared in after visit summary)      This document has been electronically signed by ROCKY Thomas   December 6, 2022 15:44 EST

## 2022-12-07 LAB
ALBUMIN SERPL-MCNC: 4.5 G/DL (ref 3.5–5.2)
ALBUMIN/GLOB SERPL: 1.9 G/DL
ALP SERPL-CCNC: 92 U/L (ref 39–117)
ALT SERPL W P-5'-P-CCNC: 27 U/L (ref 1–33)
ANION GAP SERPL CALCULATED.3IONS-SCNC: 7.6 MMOL/L (ref 5–15)
AST SERPL-CCNC: 22 U/L (ref 1–32)
BASOPHILS # BLD AUTO: 0.09 10*3/MM3 (ref 0–0.2)
BASOPHILS NFR BLD AUTO: 0.8 % (ref 0–1.5)
BILIRUB SERPL-MCNC: 0.2 MG/DL (ref 0–1.2)
BUN SERPL-MCNC: 10 MG/DL (ref 6–20)
BUN/CREAT SERPL: 14.1 (ref 7–25)
CALCIUM SPEC-SCNC: 9.1 MG/DL (ref 8.6–10.5)
CHLORIDE SERPL-SCNC: 100 MMOL/L (ref 98–107)
CHOLEST SERPL-MCNC: 165 MG/DL (ref 0–200)
CO2 SERPL-SCNC: 25.4 MMOL/L (ref 22–29)
CREAT SERPL-MCNC: 0.71 MG/DL (ref 0.57–1)
DEPRECATED RDW RBC AUTO: 39.3 FL (ref 37–54)
EGFRCR SERPLBLD CKD-EPI 2021: 107 ML/MIN/1.73
EOSINOPHIL # BLD AUTO: 0.11 10*3/MM3 (ref 0–0.4)
EOSINOPHIL NFR BLD AUTO: 1 % (ref 0.3–6.2)
ERYTHROCYTE [DISTWIDTH] IN BLOOD BY AUTOMATED COUNT: 11.6 % (ref 12.3–15.4)
GLOBULIN UR ELPH-MCNC: 2.4 GM/DL
GLUCOSE SERPL-MCNC: 86 MG/DL (ref 65–99)
HCT VFR BLD AUTO: 38.3 % (ref 34–46.6)
HDLC SERPL-MCNC: 61 MG/DL (ref 40–60)
HGB BLD-MCNC: 12.8 G/DL (ref 12–15.9)
IMM GRANULOCYTES # BLD AUTO: 0.14 10*3/MM3 (ref 0–0.05)
IMM GRANULOCYTES NFR BLD AUTO: 1.3 % (ref 0–0.5)
LDLC SERPL CALC-MCNC: 89 MG/DL (ref 0–100)
LDLC/HDLC SERPL: 1.45 {RATIO}
LYMPHOCYTES # BLD AUTO: 2.66 10*3/MM3 (ref 0.7–3.1)
LYMPHOCYTES NFR BLD AUTO: 24.1 % (ref 19.6–45.3)
MCH RBC QN AUTO: 31.2 PG (ref 26.6–33)
MCHC RBC AUTO-ENTMCNC: 33.4 G/DL (ref 31.5–35.7)
MCV RBC AUTO: 93.4 FL (ref 79–97)
MONOCYTES # BLD AUTO: 1.04 10*3/MM3 (ref 0.1–0.9)
MONOCYTES NFR BLD AUTO: 9.4 % (ref 5–12)
NEUTROPHILS NFR BLD AUTO: 6.98 10*3/MM3 (ref 1.7–7)
NEUTROPHILS NFR BLD AUTO: 63.4 % (ref 42.7–76)
NRBC BLD AUTO-RTO: 0 /100 WBC (ref 0–0.2)
PLATELET # BLD AUTO: 333 10*3/MM3 (ref 140–450)
PMV BLD AUTO: 10.3 FL (ref 6–12)
POTASSIUM SERPL-SCNC: 4.2 MMOL/L (ref 3.5–5.2)
PROT SERPL-MCNC: 6.9 G/DL (ref 6–8.5)
RBC # BLD AUTO: 4.1 10*6/MM3 (ref 3.77–5.28)
SODIUM SERPL-SCNC: 133 MMOL/L (ref 136–145)
TRIGL SERPL-MCNC: 78 MG/DL (ref 0–150)
TSH SERPL DL<=0.05 MIU/L-ACNC: 2.41 UIU/ML (ref 0.27–4.2)
VLDLC SERPL-MCNC: 15 MG/DL (ref 5–40)
WBC NRBC COR # BLD: 11.02 10*3/MM3 (ref 3.4–10.8)

## 2022-12-08 ENCOUNTER — TELEPHONE (OUTPATIENT)
Dept: FAMILY MEDICINE CLINIC | Facility: CLINIC | Age: 47
End: 2022-12-08

## 2022-12-08 DIAGNOSIS — D72.829 LEUKOCYTOSIS, UNSPECIFIED TYPE: Primary | ICD-10-CM

## 2022-12-08 DIAGNOSIS — J06.9 ACUTE URI: Primary | ICD-10-CM

## 2022-12-08 DIAGNOSIS — E03.9 HYPOTHYROIDISM, UNSPECIFIED TYPE: Chronic | ICD-10-CM

## 2022-12-08 RX ORDER — CEFDINIR 300 MG/1
300 CAPSULE ORAL 2 TIMES DAILY
Qty: 20 CAPSULE | Refills: 0 | Status: SHIPPED | OUTPATIENT
Start: 2022-12-08

## 2022-12-08 RX ORDER — LEVOTHYROXINE SODIUM 0.03 MG/1
25 TABLET ORAL DAILY
Qty: 30 TABLET | Refills: 2 | Status: SHIPPED | OUTPATIENT
Start: 2022-12-08 | End: 2023-03-09 | Stop reason: SDUPTHER

## 2022-12-08 NOTE — TELEPHONE ENCOUNTER
Caller: Annalisa Whittington    Relationship: Self    Best call back number: [phone]    Requested Prescriptions:   Requested Prescriptions     Pending Prescriptions Disp Refills   • levothyroxine (SYNTHROID, LEVOTHROID) 25 MCG tablet 30 tablet 2     Sig: Take 1 tablet by mouth Daily.        Pharmacy where request should be sent: [facility] [address] [place] [address] - [phone]  - [phone]     Additional details provided by patient: PLEASE REFILL 90 DAY SUPPLY WILL BE OUT OF TOWN     Does the patient have less than a 3 day supply:  [x] Yes  [] No    Would you like a call back once the refill request has been completed: [x] Yes [] No    If the office needs to give you a call back, can they leave a voicemail: [x] Yes [] No    Lore Black Rep   12/08/22 15:35 EST

## 2022-12-08 NOTE — TELEPHONE ENCOUNTER
Caller: EZRA YBARRA    Relationship: Emergency Contact    Best call back number: [phone]    Who are you requesting to speak with (clinical staff, provider,  specific staff member):  CLINICAL     What was the call regarding: EZRA IS REQUESTING AN ANTIBIOTIC  CALLED IN FOR THE PATIENT   SHE HAS GREEN AND TAN DISCHARGE COMING FROM FROM HER SINUS     [facility] CONFIRMED

## 2022-12-12 ENCOUNTER — CLINICAL SUPPORT (OUTPATIENT)
Dept: FAMILY MEDICINE CLINIC | Facility: CLINIC | Age: 47
End: 2022-12-12

## 2022-12-12 DIAGNOSIS — D72.829 LEUKOCYTOSIS, UNSPECIFIED TYPE: ICD-10-CM

## 2022-12-12 PROCEDURE — 85025 COMPLETE CBC W/AUTO DIFF WBC: CPT | Performed by: NURSE PRACTITIONER

## 2022-12-12 PROCEDURE — 36415 COLL VENOUS BLD VENIPUNCTURE: CPT | Performed by: NURSE PRACTITIONER

## 2022-12-12 NOTE — PROGRESS NOTES
Venipuncture Blood Specimen Collection  Venipuncture performed in Right arm by Arielle Silva MA with good hemostasis. Patient tolerated the procedure well without complications.   12/12/22   Arielle Silva MA

## 2022-12-13 LAB
BASOPHILS # BLD AUTO: 0.07 10*3/MM3 (ref 0–0.2)
BASOPHILS NFR BLD AUTO: 0.9 % (ref 0–1.5)
DEPRECATED RDW RBC AUTO: 37.3 FL (ref 37–54)
EOSINOPHIL # BLD AUTO: 0.13 10*3/MM3 (ref 0–0.4)
EOSINOPHIL NFR BLD AUTO: 1.7 % (ref 0.3–6.2)
ERYTHROCYTE [DISTWIDTH] IN BLOOD BY AUTOMATED COUNT: 11.4 % (ref 12.3–15.4)
HCT VFR BLD AUTO: 40.3 % (ref 34–46.6)
HGB BLD-MCNC: 13.8 G/DL (ref 12–15.9)
IMM GRANULOCYTES # BLD AUTO: 0.14 10*3/MM3 (ref 0–0.05)
IMM GRANULOCYTES NFR BLD AUTO: 1.8 % (ref 0–0.5)
LYMPHOCYTES # BLD AUTO: 2.35 10*3/MM3 (ref 0.7–3.1)
LYMPHOCYTES NFR BLD AUTO: 30.7 % (ref 19.6–45.3)
MCH RBC QN AUTO: 30.7 PG (ref 26.6–33)
MCHC RBC AUTO-ENTMCNC: 34.2 G/DL (ref 31.5–35.7)
MCV RBC AUTO: 89.8 FL (ref 79–97)
MONOCYTES # BLD AUTO: 0.51 10*3/MM3 (ref 0.1–0.9)
MONOCYTES NFR BLD AUTO: 6.7 % (ref 5–12)
NEUTROPHILS NFR BLD AUTO: 4.45 10*3/MM3 (ref 1.7–7)
NEUTROPHILS NFR BLD AUTO: 58.2 % (ref 42.7–76)
NRBC BLD AUTO-RTO: 0 /100 WBC (ref 0–0.2)
PLATELET # BLD AUTO: 367 10*3/MM3 (ref 140–450)
PMV BLD AUTO: 10.3 FL (ref 6–12)
RBC # BLD AUTO: 4.49 10*6/MM3 (ref 3.77–5.28)
WBC NRBC COR # BLD: 7.65 10*3/MM3 (ref 3.4–10.8)

## 2022-12-14 ENCOUNTER — TELEPHONE (OUTPATIENT)
Dept: FAMILY MEDICINE CLINIC | Facility: CLINIC | Age: 47
End: 2022-12-14

## 2022-12-14 LAB
CONV .: NORMAL
CYTOLOGIST CVX/VAG CYTO: NORMAL
CYTOLOGY CVX/VAG DOC CYTO: NORMAL
CYTOLOGY CVX/VAG DOC THIN PREP: NORMAL
DX ICD CODE: NORMAL
HIV 1 & 2 AB SER-IMP: NORMAL
OTHER STN SPEC: NORMAL
STAT OF ADQ CVX/VAG CYTO-IMP: NORMAL

## 2022-12-14 NOTE — TELEPHONE ENCOUNTER
I called pt and spoke with Chauncey to let him know this is pending. They ask that as soon as result is available, please call them back.

## 2022-12-14 NOTE — TELEPHONE ENCOUNTER
Caller: EZRA YBARRA    Relationship: Emergency Contact    Best call back number: [phone]      What test was performed: PAP SMEAR    When was the test performed: 12/6    Where was the test performed: OFFICE

## 2022-12-30 ENCOUNTER — APPOINTMENT (OUTPATIENT)
Dept: MAMMOGRAPHY | Facility: HOSPITAL | Age: 47
End: 2022-12-30

## 2023-03-09 DIAGNOSIS — E03.9 HYPOTHYROIDISM, UNSPECIFIED TYPE: Chronic | ICD-10-CM

## 2023-03-09 RX ORDER — LEVOTHYROXINE SODIUM 0.03 MG/1
25 TABLET ORAL DAILY
Qty: 30 TABLET | Refills: 0 | Status: SHIPPED | OUTPATIENT
Start: 2023-03-09 | End: 2023-04-07 | Stop reason: SDUPTHER

## 2023-03-09 NOTE — TELEPHONE ENCOUNTER
Caller: EZRA YBARRA    Relationship: Emergency Contact    Best call back number: [phone]    Requested Prescriptions:   Requested Prescriptions     Pending Prescriptions Disp Refills   • levothyroxine (SYNTHROID, LEVOTHROID) 25 MCG tablet 30 tablet 2     Sig: Take 1 tablet by mouth Daily.        Pharmacy where request should be sent: [facility] [address] [place] [address] [phone] [facility] [phone] FX     Additional details provided by patient: PATIENT IS IN [place] AND IS REQUESTING A TEMPORARY REFILL TO GET THROUGH THE WEEKEND. PLEASE ADVISE    Does the patient have less than a 3 day supply:  [x] Yes  [] No    Would you like a call back once the refill request has been completed: [x] Yes [] No    If the office needs to give you a call back, can they leave a voicemail: [x] Yes [] No    Lore Mcrae Rep   03/09/23 09:52 EST

## 2023-04-07 ENCOUNTER — OFFICE VISIT (OUTPATIENT)
Dept: FAMILY MEDICINE CLINIC | Facility: CLINIC | Age: 48
End: 2023-04-07
Payer: COMMERCIAL

## 2023-04-07 VITALS
OXYGEN SATURATION: 99 % | DIASTOLIC BLOOD PRESSURE: 98 MMHG | SYSTOLIC BLOOD PRESSURE: 138 MMHG | WEIGHT: 162.2 LBS | HEART RATE: 74 BPM | BODY MASS INDEX: 31.84 KG/M2 | TEMPERATURE: 98.2 F | HEIGHT: 60 IN

## 2023-04-07 DIAGNOSIS — J30.2 SEASONAL ALLERGIC RHINITIS, UNSPECIFIED TRIGGER: ICD-10-CM

## 2023-04-07 DIAGNOSIS — E03.9 HYPOTHYROIDISM, UNSPECIFIED TYPE: Chronic | ICD-10-CM

## 2023-04-07 DIAGNOSIS — J01.10 ACUTE NON-RECURRENT FRONTAL SINUSITIS: ICD-10-CM

## 2023-04-07 DIAGNOSIS — N39.0 URINARY TRACT INFECTION WITHOUT HEMATURIA, SITE UNSPECIFIED: Primary | ICD-10-CM

## 2023-04-07 DIAGNOSIS — F41.9 ANXIETY: Chronic | ICD-10-CM

## 2023-04-07 LAB
BILIRUB BLD-MCNC: ABNORMAL MG/DL
CLARITY, POC: ABNORMAL
COLOR UR: ABNORMAL
GLUCOSE UR STRIP-MCNC: NEGATIVE MG/DL
KETONES UR QL: ABNORMAL
LEUKOCYTE EST, POC: ABNORMAL
NITRITE UR-MCNC: POSITIVE MG/ML
PH UR: 6 [PH] (ref 5–8)
PROT UR STRIP-MCNC: ABNORMAL MG/DL
RBC # UR STRIP: NEGATIVE /UL
SP GR UR: 1.03 (ref 1–1.03)
UROBILINOGEN UR QL: ABNORMAL

## 2023-04-07 PROCEDURE — 87086 URINE CULTURE/COLONY COUNT: CPT | Performed by: NURSE PRACTITIONER

## 2023-04-07 RX ORDER — EPINEPHRINE 0.3 MG/.3ML
INJECTION SUBCUTANEOUS
COMMUNITY
Start: 2023-01-30

## 2023-04-07 RX ORDER — HYDROXYZINE HYDROCHLORIDE 25 MG/1
25 TABLET, FILM COATED ORAL 3 TIMES DAILY PRN
Qty: 90 TABLET | Refills: 1 | Status: SHIPPED | OUTPATIENT
Start: 2023-04-07

## 2023-04-07 RX ORDER — FLUCONAZOLE 100 MG/1
100 TABLET ORAL DAILY
Qty: 3 TABLET | Refills: 0 | Status: SHIPPED | OUTPATIENT
Start: 2023-04-07

## 2023-04-07 RX ORDER — LEVOTHYROXINE SODIUM 0.03 MG/1
25 TABLET ORAL DAILY
Qty: 90 TABLET | Refills: 1 | Status: SHIPPED | OUTPATIENT
Start: 2023-04-07

## 2023-04-07 RX ORDER — LORATADINE 10 MG/1
10 TABLET ORAL DAILY
Qty: 90 TABLET | Refills: 2 | Status: SHIPPED | OUTPATIENT
Start: 2023-04-07

## 2023-04-07 RX ORDER — AMOXICILLIN AND CLAVULANATE POTASSIUM 875; 125 MG/1; MG/1
1 TABLET, FILM COATED ORAL 2 TIMES DAILY
Qty: 20 TABLET | Refills: 0 | Status: SHIPPED | OUTPATIENT
Start: 2023-04-07 | End: 2023-04-17

## 2023-04-07 NOTE — PROGRESS NOTES
"Piper Whittington is a 47 y.o. female.     Chief Complaint   Patient presents with   • Urinary Tract Infection   • Sinusitis       History of Present Illness  She presents with c/o sinus drainage and sore throat for about 6 days. She has green and bloody sinus drainage. She c/o sinus pain, pressure and headache. She denies fever and chills. She c/o cough that is dry. She took acetaminophen this morning. She also c/o dysuria for 3-4 days.        The following portions of the patient's history were reviewed and updated as appropriate: allergies, current medications, past family history, past medical history, past social history, past surgical history and problem list.    Review of Systems   Constitutional: Negative for fatigue and fever.   HENT: Positive for postnasal drip, rhinorrhea, sinus pressure, sinus pain and sore throat. Negative for ear pain.    Respiratory: Positive for cough. Negative for chest tightness and shortness of breath.    Cardiovascular: Negative for chest pain, palpitations and leg swelling.   Gastrointestinal: Negative for abdominal pain, blood in stool, constipation, diarrhea, nausea and vomiting.   Genitourinary: Positive for dysuria. Negative for hematuria.   Allergic/Immunologic: Negative for environmental allergies.   Neurological: Positive for headaches.   Hematological: Negative for adenopathy.   Psychiatric/Behavioral: Negative for suicidal ideas. The patient is not nervous/anxious.        Objective     /98 (BP Location: Left arm, Patient Position: Sitting, Cuff Size: Adult)   Pulse 74   Temp 98.2 °F (36.8 °C) (Temporal)   Ht 152.4 cm (60\")   Wt 73.6 kg (162 lb 3.2 oz)   SpO2 99%   BMI 31.68 kg/m²     Physical Exam  Vitals reviewed.   Constitutional:       General: She is not in acute distress.     Appearance: Normal appearance. She is well-developed. She is not diaphoretic.   HENT:      Head: Normocephalic and atraumatic.      Right Ear: Hearing, tympanic membrane, " ear canal and external ear normal.      Left Ear: Hearing, tympanic membrane, ear canal and external ear normal.      Nose:      Right Sinus: Frontal sinus tenderness present. No maxillary sinus tenderness.      Left Sinus: No maxillary sinus tenderness or frontal sinus tenderness.      Mouth/Throat:      Pharynx: Uvula midline.   Eyes:      General: Lids are normal.      Conjunctiva/sclera: Conjunctivae normal.   Neck:      Trachea: Trachea normal. No tracheal tenderness or tracheal deviation.   Cardiovascular:      Rate and Rhythm: Normal rate and regular rhythm.      Heart sounds: Normal heart sounds, S1 normal and S2 normal. No murmur heard.    No friction rub. No gallop.   Pulmonary:      Effort: Pulmonary effort is normal. No respiratory distress.      Breath sounds: Normal breath sounds.   Abdominal:      General: Bowel sounds are normal. There is no distension.      Palpations: Abdomen is soft.      Tenderness: There is no abdominal tenderness. There is no right CVA tenderness or left CVA tenderness.   Lymphadenopathy:      Cervical: No cervical adenopathy.   Skin:     General: Skin is warm and dry.   Neurological:      Mental Status: She is alert and oriented to person, place, and time.   Psychiatric:         Behavior: Behavior normal.         Thought Content: Thought content normal.         Judgment: Judgment normal.         Current Outpatient Medications   Medication Sig Dispense Refill   • busPIRone (BUSPAR) 10 MG tablet Take 1 tablet by mouth 2 (Two) Times a Day.     • FLUoxetine (PROzac) 40 MG capsule Take 1 capsule by mouth Every Morning. 30 capsule 3   • hydrOXYzine (ATARAX) 25 MG tablet Take 1 tablet by mouth 3 (Three) Times a Day As Needed for Anxiety. for anxiety 90 tablet 1   • levothyroxine (SYNTHROID, LEVOTHROID) 25 MCG tablet Take 1 tablet by mouth Daily. 90 tablet 1   • Multiple Vitamins-Minerals (CENTRUM ADULTS PO) Take  by mouth.     • amoxicillin-clavulanate (Augmentin) 875-125 MG per  tablet Take 1 tablet by mouth 2 (Two) Times a Day for 10 days. 20 tablet 0   • EPINEPHrine (EPIPEN) 0.3 MG/0.3ML solution auto-injector injection      • fluconazole (Diflucan) 100 MG tablet Take 1 tablet by mouth Daily. 3 tablet 0   • fluticasone (Flonase) 50 MCG/ACT nasal spray 2 sprays into the nostril(s) as directed by provider Daily. (Patient not taking: Reported on 4/7/2023) 15.8 mL 5   • loratadine (Claritin) 10 MG tablet Take 1 tablet by mouth Daily. 90 tablet 2     No current facility-administered medications for this visit.            Assessment & Plan     Problem List Items Addressed This Visit    None  Visit Diagnoses     Urinary tract infection without hematuria, site unspecified    -  Primary    Relevant Medications    amoxicillin-clavulanate (Augmentin) 875-125 MG per tablet    fluconazole (Diflucan) 100 MG tablet    Other Relevant Orders    POCT urinalysis dipstick, manual (Completed)    Urine Culture - Urine, Urine, Clean Catch    Acute non-recurrent frontal sinusitis        Relevant Medications    amoxicillin-clavulanate (Augmentin) 875-125 MG per tablet    Hypothyroidism, unspecified type  (Chronic)       Relevant Medications    levothyroxine (SYNTHROID, LEVOTHROID) 25 MCG tablet    Anxiety  (Chronic)       Relevant Medications    hydrOXYzine (ATARAX) 25 MG tablet    Seasonal allergic rhinitis, unspecified trigger        Relevant Medications    loratadine (Claritin) 10 MG tablet            ICD-10-CM ICD-9-CM   1. Urinary tract infection without hematuria, site unspecified  N39.0 599.0   2. Acute non-recurrent frontal sinusitis  J01.10 461.1   3. Hypothyroidism, unspecified type  E03.9 244.9   4. Anxiety  F41.9 300.00   5. Seasonal allergic rhinitis, unspecified trigger  J30.2 477.9       Plan: She declines flu and covid testing. Urinalysis has leukocytes and is nitrite positive. Augmentin ordered for sinusitis and uti. Send urine for culture. Keep scheduled follow up and follow up as needed.      @Body mass index is 31.68 kg/m².              Understands disease processes and need for medications.  Understands reasons for urgent and emergent care.  Patient (& family) verbalized agreement for treatment plan.   Emotional support and active listening provided.  Patient provided time to verbalize feelings.           BMI is >= 30 and <35. (Class 1 Obesity). The following options were offered after discussion;: weight loss educational material (shared in after visit summary)      This document has been electronically signed by ROCKY Thomas   April 7, 2023 13:44 EDT

## 2023-04-08 LAB — BACTERIA SPEC AEROBE CULT: NO GROWTH

## 2023-07-20 PROBLEM — E03.9 HYPOTHYROIDISM: Chronic | Status: ACTIVE | Noted: 2023-07-20

## 2023-07-20 PROCEDURE — 80050 GENERAL HEALTH PANEL: CPT | Performed by: NURSE PRACTITIONER

## 2023-07-20 PROCEDURE — 80061 LIPID PANEL: CPT | Performed by: NURSE PRACTITIONER

## 2023-08-28 DIAGNOSIS — F41.9 ANXIETY: Chronic | ICD-10-CM

## 2023-08-28 RX ORDER — HYDROXYZINE HYDROCHLORIDE 25 MG/1
TABLET, FILM COATED ORAL
Qty: 30 TABLET | Refills: 0 | Status: SHIPPED | OUTPATIENT
Start: 2023-08-28

## 2023-10-12 DIAGNOSIS — F41.9 ANXIETY: Chronic | ICD-10-CM

## 2023-10-13 RX ORDER — HYDROXYZINE HYDROCHLORIDE 25 MG/1
TABLET, FILM COATED ORAL
Qty: 90 TABLET | Refills: 0 | Status: SHIPPED | OUTPATIENT
Start: 2023-10-13

## 2023-11-14 ENCOUNTER — TELEPHONE (OUTPATIENT)
Dept: FAMILY MEDICINE CLINIC | Facility: CLINIC | Age: 48
End: 2023-11-14
Payer: COMMERCIAL

## 2023-11-14 ENCOUNTER — PATIENT MESSAGE (OUTPATIENT)
Dept: FAMILY MEDICINE CLINIC | Facility: CLINIC | Age: 48
End: 2023-11-14
Payer: COMMERCIAL

## 2023-11-20 ENCOUNTER — TELEPHONE (OUTPATIENT)
Dept: FAMILY MEDICINE CLINIC | Facility: CLINIC | Age: 48
End: 2023-11-20
Payer: COMMERCIAL

## 2023-11-20 NOTE — TELEPHONE ENCOUNTER
She needs to be seen for a blood pressure that high. We cannot do out of state video visits. She needs to go somewhere where she is.

## 2023-11-20 NOTE — TELEPHONE ENCOUNTER
----- Message from Arielle Silva MA sent at 11/20/2023  3:25 PM EST -----  Regarding: FW: Bp?  Contact: [phone]    ----- Message -----  From: Annalisa Whittington  Sent: 11/20/2023   3:03 PM EST  To: [facility]  Subject: Bp?                                              We are out of town at the moment. It's back to 154/90

## 2023-12-28 RX ORDER — EPINEPHRINE 0.3 MG/.3ML
0.3 INJECTION SUBCUTANEOUS ONCE
Qty: 1 EACH | Refills: 0 | Status: SHIPPED | OUTPATIENT
Start: 2023-12-28 | End: 2023-12-28

## 2024-01-18 ENCOUNTER — HOSPITAL ENCOUNTER (OUTPATIENT)
Dept: MAMMOGRAPHY | Facility: HOSPITAL | Age: 49
Discharge: HOME | End: 2024-01-18
Admitting: NURSE PRACTITIONER
Payer: COMMERCIAL

## 2024-01-18 DIAGNOSIS — Z12.31 SCREENING MAMMOGRAM FOR BREAST CANCER: ICD-10-CM

## 2024-01-18 PROCEDURE — 77063 BREAST TOMOSYNTHESIS BI: CPT

## 2024-01-18 PROCEDURE — 77067 SCR MAMMO BI INCL CAD: CPT

## 2024-01-22 ENCOUNTER — OFFICE VISIT (OUTPATIENT)
Dept: FAMILY MEDICINE CLINIC | Facility: CLINIC | Age: 49
End: 2024-01-22
Payer: COMMERCIAL

## 2024-01-22 VITALS
RESPIRATION RATE: 16 BRPM | HEART RATE: 62 BPM | DIASTOLIC BLOOD PRESSURE: 78 MMHG | HEIGHT: 60 IN | BODY MASS INDEX: 34.75 KG/M2 | TEMPERATURE: 98.2 F | SYSTOLIC BLOOD PRESSURE: 114 MMHG | OXYGEN SATURATION: 99 % | WEIGHT: 177 LBS

## 2024-01-22 DIAGNOSIS — Z12.11 ENCOUNTER FOR SCREENING COLONOSCOPY: ICD-10-CM

## 2024-01-22 DIAGNOSIS — I10 ESSENTIAL HYPERTENSION: Primary | Chronic | ICD-10-CM

## 2024-01-22 DIAGNOSIS — R14.0 ABDOMINAL BLOATING: ICD-10-CM

## 2024-01-22 DIAGNOSIS — N93.9 ABNORMAL VAGINAL BLEEDING: ICD-10-CM

## 2024-01-22 DIAGNOSIS — Z13.220 SCREENING, LIPID: ICD-10-CM

## 2024-01-22 DIAGNOSIS — E03.9 HYPOTHYROIDISM, UNSPECIFIED TYPE: Chronic | ICD-10-CM

## 2024-01-22 PROCEDURE — 80061 LIPID PANEL: CPT | Performed by: NURSE PRACTITIONER

## 2024-01-22 PROCEDURE — 84439 ASSAY OF FREE THYROXINE: CPT | Performed by: NURSE PRACTITIONER

## 2024-01-22 PROCEDURE — 80050 GENERAL HEALTH PANEL: CPT | Performed by: NURSE PRACTITIONER

## 2024-01-22 RX ORDER — METOPROLOL SUCCINATE 25 MG/1
25 TABLET, EXTENDED RELEASE ORAL DAILY
Qty: 30 TABLET | Refills: 5 | Status: SHIPPED | OUTPATIENT
Start: 2024-01-22

## 2024-01-22 RX ORDER — LOSARTAN POTASSIUM 100 MG/1
100 TABLET ORAL DAILY
Qty: 30 TABLET | Refills: 5 | Status: SHIPPED | OUTPATIENT
Start: 2024-01-22

## 2024-01-22 NOTE — PROGRESS NOTES
Venipuncture Blood Specimen Collection  Venipuncture performed in right arm by Carolyn Grace MA with good hemostasis. Patient tolerated the procedure well without complications.   01/22/24   Carolyn Grace MA

## 2024-01-22 NOTE — PROGRESS NOTES
Subjective   Annalisa Whittington is a 48 y.o. female.     Chief Complaint   Patient presents with    Hypertension    Hypothyroidism    abnormal vaginal bleeding    Bloated       History of Present Illness  She presents with c/o high blood pressure for the past few months. She states it has been up to 170/120. She states she could feel it and her ears would be ringing. Her head was throbbing. She started taking her 's metoprolol 12.5mg and losartan 50mg and it didn't come down. She is taking metoprolol 25mg daily and losartan 100mg daily and her blood pressure is well controlled now. She doesn't want to take blood pressure medicine. She wants to lose weight. She was doing keto diet and lost no weight. She did that with exercise with walking and bike riding. She c/o an odd shape to her stomach and is concerned about uterine fibroids. She states it has gotten worse in the past few weeks. She c/o irregular vaginal bleeding on and off for the past 10 years. She states her periods come more often than in the past sometimes. She is concerned because she had an abnormal pap smear that was positive for HPV in the past. Her last pap smear was normal.  Hypertension  This is a recurrent problem. The problem has been improved since onset. Pertinent negatives include no anxiety, blurred vision, chest pain, malaise/fatigue, orthopnea, palpitations, peripheral edema or shortness of breath. Agents associated with hypertension include thyroid hormones. There are no compliance problems.         The following portions of the patient's history were reviewed and updated as appropriate: allergies, current medications, past family history, past medical history, past social history, past surgical history and problem list.    Review of Systems   Constitutional:  Negative for fever and malaise/fatigue.   Eyes:  Negative for blurred vision.   Respiratory:  Negative for cough, chest tightness and shortness of breath.    Cardiovascular:  Negative  "for chest pain, palpitations, orthopnea and leg swelling.   Gastrointestinal:  Positive for abdominal distention. Negative for abdominal pain, blood in stool, constipation, diarrhea, nausea and vomiting.   Genitourinary:  Positive for menstrual problem. Negative for dysuria.   Psychiatric/Behavioral:  Negative for suicidal ideas. The patient is nervous/anxious.        Objective     /78 (BP Location: Right arm, Patient Position: Sitting, Cuff Size: Adult)   Pulse 62   Temp 98.2 °F (36.8 °C) (Temporal)   Resp 16   Ht 152.4 cm (60\")   Wt 80.3 kg (177 lb)   SpO2 99%   BMI 34.57 kg/m²     Physical Exam  Vitals reviewed.   Constitutional:       General: She is not in acute distress.     Appearance: Normal appearance. She is well-developed. She is not diaphoretic.   HENT:      Head: Normocephalic and atraumatic.   Cardiovascular:      Rate and Rhythm: Normal rate and regular rhythm.      Heart sounds: Normal heart sounds, S1 normal and S2 normal. No murmur heard.     No friction rub. No gallop.   Pulmonary:      Effort: Pulmonary effort is normal. No respiratory distress.      Breath sounds: Normal breath sounds.   Abdominal:      General: Bowel sounds are normal. There is no distension.      Palpations: Abdomen is soft.      Tenderness: There is no abdominal tenderness. There is no guarding or rebound.   Skin:     General: Skin is warm and dry.   Neurological:      Mental Status: She is alert and oriented to person, place, and time.   Psychiatric:         Behavior: Behavior normal.         Thought Content: Thought content normal.         Judgment: Judgment normal.         Current Outpatient Medications   Medication Sig Dispense Refill    busPIRone (BUSPAR) 10 MG tablet Take 1 tablet by mouth 2 (Two) Times a Day.      FLUoxetine (PROzac) 40 MG capsule Take 1 capsule by mouth Every Morning. 30 capsule 3    hydrOXYzine (ATARAX) 25 MG tablet TAKE 1 TABLET BY MOUTH THREE TIMES DAILY AS NEEDED FOR ANXIETY 90 tablet " 0    levothyroxine (SYNTHROID, LEVOTHROID) 25 MCG tablet Take 1 tablet by mouth Daily. 90 tablet 1    loratadine (Claritin) 10 MG tablet Take 1 tablet by mouth Daily. 90 tablet 2    Multiple Vitamins-Minerals (CENTRUM ADULTS PO) Take  by mouth.      losartan (Cozaar) 100 MG tablet Take 1 tablet by mouth Daily. 30 tablet 5    metoprolol succinate XL (Toprol XL) 25 MG 24 hr tablet Take 1 tablet by mouth Daily. 30 tablet 5     No current facility-administered medications for this visit.            Assessment & Plan     Problem List Items Addressed This Visit       Hypothyroidism (Chronic)    Relevant Medications    metoprolol succinate XL (Toprol XL) 25 MG 24 hr tablet    Other Relevant Orders    TSH    T4, Free     Other Visit Diagnoses       Essential hypertension  (Chronic)   -  Primary    Relevant Medications    metoprolol succinate XL (Toprol XL) 25 MG 24 hr tablet    losartan (Cozaar) 100 MG tablet    Other Relevant Orders    CBC & Differential    Comprehensive Metabolic Panel    Abnormal vaginal bleeding        Relevant Orders    US Non-ob Transvaginal    Abdominal bloating        Relevant Orders    US Abdomen Complete    Screening, lipid        Relevant Orders    Lipid Panel    Encounter for screening colonoscopy        Relevant Orders    Ambulatory Referral For Screening Colonoscopy              ICD-10-CM ICD-9-CM   1. Essential hypertension  I10 401.9   2. Abnormal vaginal bleeding  N93.9 623.8   3. Abdominal bloating  R14.0 787.3   4. Hypothyroidism, unspecified type  E03.9 244.9   5. Screening, lipid  Z13.220 V77.91   6. Encounter for screening colonoscopy  Z12.11 V76.51       Plan: Get labs today including tsh to check thyroid function. Continue metoprolol and losartan. Get ultrasound abd and pelvis to look for cause of bloating and abnormal vaginal bleeding. Could be thyroid related. Refer for screening colonoscopy. Get pregnancy test. Schedule pap smear. Follow up in one month and pending results.      @Body mass index is 34.57 kg/m².                Understands disease processes and need for medications.  Understands reasons for urgent and emergent care.  Patient (& family) verbalized agreement for treatment plan.   Emotional support and active listening provided.  Patient provided time to verbalize feelings.                  This document has been electronically signed by ROCKY Thomas   January 23, 2024 09:27 EST  Answers submitted by the patient for this visit:  Primary Reason for Visit (Submitted on 1/21/2024)  What is the primary reason for your visit?: High Blood Pressure

## 2024-01-23 DIAGNOSIS — Z12.11 ENCOUNTER FOR SCREENING FOR MALIGNANT NEOPLASM OF COLON: Primary | ICD-10-CM

## 2024-01-23 LAB
ALBUMIN SERPL-MCNC: 4.6 G/DL (ref 3.5–5.2)
ALBUMIN/GLOB SERPL: 1.6 G/DL
ALP SERPL-CCNC: 73 U/L (ref 39–117)
ALT SERPL W P-5'-P-CCNC: 28 U/L (ref 1–33)
ANION GAP SERPL CALCULATED.3IONS-SCNC: 12.5 MMOL/L (ref 5–15)
AST SERPL-CCNC: 19 U/L (ref 1–32)
BASOPHILS # BLD AUTO: 0.05 10*3/MM3 (ref 0–0.2)
BASOPHILS NFR BLD AUTO: 0.6 % (ref 0–1.5)
BILIRUB SERPL-MCNC: 0.6 MG/DL (ref 0–1.2)
BUN SERPL-MCNC: 14 MG/DL (ref 6–20)
BUN/CREAT SERPL: 14.9 (ref 7–25)
CALCIUM SPEC-SCNC: 9.1 MG/DL (ref 8.6–10.5)
CHLORIDE SERPL-SCNC: 100 MMOL/L (ref 98–107)
CHOLEST SERPL-MCNC: 222 MG/DL (ref 0–200)
CO2 SERPL-SCNC: 23.5 MMOL/L (ref 22–29)
CREAT SERPL-MCNC: 0.94 MG/DL (ref 0.57–1)
DEPRECATED RDW RBC AUTO: 38.4 FL (ref 37–54)
EGFRCR SERPLBLD CKD-EPI 2021: 75.9 ML/MIN/1.73
EOSINOPHIL # BLD AUTO: 0.1 10*3/MM3 (ref 0–0.4)
EOSINOPHIL NFR BLD AUTO: 1.3 % (ref 0.3–6.2)
ERYTHROCYTE [DISTWIDTH] IN BLOOD BY AUTOMATED COUNT: 11.5 % (ref 12.3–15.4)
GLOBULIN UR ELPH-MCNC: 2.9 GM/DL
GLUCOSE SERPL-MCNC: 85 MG/DL (ref 65–99)
HCT VFR BLD AUTO: 40.2 % (ref 34–46.6)
HDLC SERPL-MCNC: 52 MG/DL (ref 40–60)
HGB BLD-MCNC: 13.7 G/DL (ref 12–15.9)
IMM GRANULOCYTES # BLD AUTO: 0.1 10*3/MM3 (ref 0–0.05)
IMM GRANULOCYTES NFR BLD AUTO: 1.3 % (ref 0–0.5)
LDLC SERPL CALC-MCNC: 149 MG/DL (ref 0–100)
LDLC/HDLC SERPL: 2.82 {RATIO}
LYMPHOCYTES # BLD AUTO: 2.18 10*3/MM3 (ref 0.7–3.1)
LYMPHOCYTES NFR BLD AUTO: 27.3 % (ref 19.6–45.3)
MCH RBC QN AUTO: 31.4 PG (ref 26.6–33)
MCHC RBC AUTO-ENTMCNC: 34.1 G/DL (ref 31.5–35.7)
MCV RBC AUTO: 92 FL (ref 79–97)
MONOCYTES # BLD AUTO: 0.62 10*3/MM3 (ref 0.1–0.9)
MONOCYTES NFR BLD AUTO: 7.8 % (ref 5–12)
NEUTROPHILS NFR BLD AUTO: 4.93 10*3/MM3 (ref 1.7–7)
NEUTROPHILS NFR BLD AUTO: 61.7 % (ref 42.7–76)
NRBC BLD AUTO-RTO: 0 /100 WBC (ref 0–0.2)
PLATELET # BLD AUTO: 333 10*3/MM3 (ref 140–450)
PMV BLD AUTO: 11 FL (ref 6–12)
POTASSIUM SERPL-SCNC: 4.2 MMOL/L (ref 3.5–5.2)
PROT SERPL-MCNC: 7.5 G/DL (ref 6–8.5)
RBC # BLD AUTO: 4.37 10*6/MM3 (ref 3.77–5.28)
SODIUM SERPL-SCNC: 136 MMOL/L (ref 136–145)
T4 FREE SERPL-MCNC: 0.98 NG/DL (ref 0.93–1.7)
TRIGL SERPL-MCNC: 118 MG/DL (ref 0–150)
TSH SERPL DL<=0.05 MIU/L-ACNC: 2.53 UIU/ML (ref 0.27–4.2)
VLDLC SERPL-MCNC: 21 MG/DL (ref 5–40)
WBC NRBC COR # BLD AUTO: 7.98 10*3/MM3 (ref 3.4–10.8)

## 2024-01-23 RX ORDER — BISACODYL 5 MG/1
20 TABLET, DELAYED RELEASE ORAL ONCE
Qty: 4 TABLET | Refills: 0 | Status: SHIPPED | OUTPATIENT
Start: 2024-01-23 | End: 2024-01-23

## 2024-01-23 RX ORDER — POLYETHYLENE GLYCOL 3350 17 G/17G
510 POWDER, FOR SOLUTION ORAL ONCE
Qty: 510 G | Refills: 0 | Status: SHIPPED | OUTPATIENT
Start: 2024-01-23 | End: 2024-01-23

## 2024-01-25 NOTE — PROGRESS NOTES
Cholesterol is very elevated. I recommend a statin to help prevent heart attack or stroke in the future. Blood] counts are normal. Kidney and liver and thyroid functions are normal. Electrolytes are normal.

## 2024-01-26 ENCOUNTER — TELEPHONE (OUTPATIENT)
Dept: FAMILY MEDICINE CLINIC | Facility: CLINIC | Age: 49
End: 2024-01-26

## 2024-01-26 DIAGNOSIS — E78.2 MIXED HYPERLIPIDEMIA: Primary | ICD-10-CM

## 2024-01-26 RX ORDER — ATORVASTATIN CALCIUM 20 MG/1
20 TABLET, FILM COATED ORAL DAILY
Qty: 90 TABLET | Refills: 1 | Status: SHIPPED | OUTPATIENT
Start: 2024-01-26 | End: 2024-01-29

## 2024-01-26 NOTE — TELEPHONE ENCOUNTER
Caller: ANDREW -     Relationship:     Best call back number: [phone]    What medication are you requesting:     CHOLESTEROL    What are your current symptoms:     LABS SHOWED HIGH CHOLESTEROL    If a prescription is needed, what is your preferred pharmacy and phone number:      [facility] [address] [address] [facility] [address] - [phone]  - [phone]     Additional notes:    LABS SHOWED HIGH CHOLESTEROL.  IF MEDICATION IS GOING TO BE PRESCRIBED, PLEASE SEND TO [facility]

## 2024-01-29 ENCOUNTER — TELEPHONE (OUTPATIENT)
Dept: FAMILY MEDICINE CLINIC | Facility: CLINIC | Age: 49
End: 2024-01-29
Payer: COMMERCIAL

## 2024-01-29 DIAGNOSIS — E78.2 MIXED HYPERLIPIDEMIA: Primary | ICD-10-CM

## 2024-01-29 RX ORDER — PRAVASTATIN SODIUM 40 MG
40 TABLET ORAL DAILY
Qty: 90 TABLET | Refills: 1 | Status: SHIPPED | OUTPATIENT
Start: 2024-01-29

## 2024-01-29 NOTE — TELEPHONE ENCOUNTER
----- Message from Arielle Silva MA sent at 1/29/2024  3:48 PM EST -----  Regarding: FW: Lab results  Contact: [phone]    ----- Message -----  From: Annalisa Whittington  Sent: 1/29/2024   3:35 PM EST  To: [facility]  Subject: Lab results                                      Annalisa took the statin twice. She developed muscle cramping in back and lower extremities and severe sleep disturbance. Headach and malaise, dizzy, fatigue. Is there an alternative to the statin you can prescribe?

## 2024-01-29 NOTE — TELEPHONE ENCOUNTER
Which statins did she try? If she has not tried all of them I recommend we try different statins. If she has tried them all there is a shot that I can refer to the specialty clinic for.

## 2024-01-31 ENCOUNTER — HOSPITAL ENCOUNTER (OUTPATIENT)
Facility: HOSPITAL | Age: 49
Discharge: HOME | End: 2024-01-31
Payer: COMMERCIAL

## 2024-01-31 DIAGNOSIS — N93.9 ABNORMAL VAGINAL BLEEDING: ICD-10-CM

## 2024-01-31 PROCEDURE — 76700 US EXAM ABDOM COMPLETE: CPT

## 2024-01-31 PROCEDURE — 76830 TRANSVAGINAL US NON-OB: CPT | Performed by: RADIOLOGY

## 2024-01-31 PROCEDURE — 76700 US EXAM ABDOM COMPLETE: CPT | Performed by: RADIOLOGY

## 2024-01-31 PROCEDURE — 76830 TRANSVAGINAL US NON-OB: CPT

## 2024-02-01 ENCOUNTER — OFFICE VISIT (OUTPATIENT)
Dept: FAMILY MEDICINE CLINIC | Facility: CLINIC | Age: 49
End: 2024-02-01
Payer: COMMERCIAL

## 2024-02-01 VITALS
WEIGHT: 177 LBS | SYSTOLIC BLOOD PRESSURE: 132 MMHG | HEART RATE: 77 BPM | BODY MASS INDEX: 34.75 KG/M2 | HEIGHT: 60 IN | DIASTOLIC BLOOD PRESSURE: 88 MMHG | TEMPERATURE: 98 F | RESPIRATION RATE: 16 BRPM | OXYGEN SATURATION: 98 %

## 2024-02-01 DIAGNOSIS — Z12.4 PAP SMEAR FOR CERVICAL CANCER SCREENING: Primary | ICD-10-CM

## 2024-02-01 DIAGNOSIS — Z86.19 HISTORY OF HPV INFECTION: ICD-10-CM

## 2024-02-01 NOTE — PROGRESS NOTES
"Piper Whittington is a 48 y.o. female.     Chief Complaint   Patient presents with    Gynecologic Exam       History of Present Illness  She presents for pap smear. She had a normal pap last year but has h/o hpv and has abnormal vaginal bleeding which she is having today. Transvaginal ultrasound was normal. .       The following portions of the patient's history were reviewed and updated as appropriate: allergies, current medications, past family history, past medical history, past social history, past surgical history and problem list.    Review of Systems   Constitutional:  Negative for fatigue.   Respiratory:  Negative for cough, shortness of breath and wheezing.    Cardiovascular:  Negative for chest pain and palpitations.   Gastrointestinal:  Negative for abdominal pain, blood in stool, constipation, diarrhea, nausea and vomiting.   Genitourinary:  Positive for vaginal bleeding. Negative for dysuria and hematuria.       Objective     /88 (BP Location: Left arm, Patient Position: Sitting, Cuff Size: Adult)   Pulse 77   Temp 98 °F (36.7 °C) (Temporal)   Resp 16   Ht 152.4 cm (60\")   Wt 80.3 kg (177 lb)   SpO2 98%   BMI 34.57 kg/m²     Physical Exam  Vitals reviewed. Exam conducted with a chaperone present.   Constitutional:       General: She is not in acute distress.     Appearance: Normal appearance. She is well-developed. She is not diaphoretic.   HENT:      Head: Normocephalic and atraumatic.   Cardiovascular:      Rate and Rhythm: Normal rate and regular rhythm.      Heart sounds: Normal heart sounds, S1 normal and S2 normal. No murmur heard.     No friction rub. No gallop.   Pulmonary:      Effort: Pulmonary effort is normal. No respiratory distress.      Breath sounds: Normal breath sounds.   Chest:      Chest wall: No mass, lacerations, deformity, swelling, tenderness, crepitus or edema. There is no dullness to percussion.   Breasts:     Shahram Score is 5.      Breasts are " symmetrical.      Right: Normal. No swelling, bleeding, inverted nipple, mass, nipple discharge, skin change or tenderness.      Left: Normal. No swelling, bleeding, inverted nipple, mass, nipple discharge, skin change or tenderness.      Comments: Sj Grace MA  Abdominal:      General: Bowel sounds are normal. There is no distension.      Palpations: Abdomen is soft.      Tenderness: There is no abdominal tenderness.      Hernia: There is no hernia in the left inguinal area or right inguinal area.   Genitourinary:     Exam position: Lithotomy position.      Pubic Area: No rash or pubic lice.       Shahram stage (genital): 5.      Labia:         Right: No rash, tenderness, lesion or injury.         Left: No rash, tenderness, lesion or injury.       Urethra: No prolapse, urethral pain, urethral swelling or urethral lesion.      Vagina: Normal. No bleeding.      Cervix: Normal. Cervical bleeding present.      Uterus: Normal.       Adnexa: Right adnexa normal and left adnexa normal.      Rectum: Normal.      Comments: Sj Grace MA  Lymphadenopathy:      Upper Body:      Right upper body: No supraclavicular, axillary or pectoral adenopathy.      Left upper body: No supraclavicular, axillary or pectoral adenopathy.      Lower Body: No right inguinal adenopathy. No left inguinal adenopathy.   Skin:     General: Skin is warm and dry.   Neurological:      Mental Status: She is alert and oriented to person, place, and time.   Psychiatric:         Behavior: Behavior normal.         Thought Content: Thought content normal.         Judgment: Judgment normal.         Current Outpatient Medications   Medication Sig Dispense Refill    busPIRone (BUSPAR) 10 MG tablet Take 1 tablet by mouth 2 (Two) Times a Day.      FLUoxetine (PROzac) 40 MG capsule Take 1 capsule by mouth Every Morning. 30 capsule 3    hydrOXYzine (ATARAX) 25 MG tablet TAKE 1 TABLET BY MOUTH THREE TIMES DAILY AS NEEDED FOR ANXIETY 90  tablet 0    levothyroxine (SYNTHROID, LEVOTHROID) 25 MCG tablet Take 1 tablet by mouth Daily. 90 tablet 1    loratadine (Claritin) 10 MG tablet Take 1 tablet by mouth Daily. 90 tablet 2    losartan (Cozaar) 100 MG tablet Take 1 tablet by mouth Daily. 30 tablet 5    metoprolol succinate XL (Toprol XL) 25 MG 24 hr tablet Take 1 tablet by mouth Daily. 30 tablet 5    Multiple Vitamins-Minerals (CENTRUM ADULTS PO) Take  by mouth.      pravastatin (Pravachol) 40 MG tablet Take 1 tablet by mouth Daily. 90 tablet 1     No current facility-administered medications for this visit.            Assessment & Plan     Problem List Items Addressed This Visit    None  Visit Diagnoses       Pap smear for cervical cancer screening    -  Primary    Relevant Orders    IGP, Aptima HPV, Rfx 16 / 18,45    History of HPV infection        Relevant Orders    IGP, Aptima HPV, Rfx 16 / 18,45              ICD-10-CM ICD-9-CM   1. Pap smear for cervical cancer screening  Z12.4 V76.2   2. History of HPV infection  Z86.19 V12.09       Plan: Counseled on taking CoQ10 with statin. Pap performed. Patient tolerated well. Keep scheduled follow up and follow up as needed.     @Body mass index is 34.57 kg/m².              Understands disease processes and need for medications.  Understands reasons for urgent and emergent care.  Patient (& family) verbalized agreement for treatment plan.   Emotional support and active listening provided.  Patient provided time to verbalize feelings.                  This document has been electronically signed by ROCKY Thomas   February 1, 2024 13:41 EST

## 2024-02-08 ENCOUNTER — TELEPHONE (OUTPATIENT)
Dept: FAMILY MEDICINE CLINIC | Facility: CLINIC | Age: 49
End: 2024-02-08

## 2024-02-08 NOTE — TELEPHONE ENCOUNTER
Caller: EZRA YBARRA    Relationship: Emergency Contact    Best call back number: [phone]     Caller requesting test results: PATIENT'S  CASTRO    What test was performed: PAP TEST    When was the test performed: 02.01.24    Where was the test performed: IN OFFICE    Additional notes: PLEASE CALL PATIENT'S  ASAP WITH RESULTS. PATIENT'S  WOULD ALSO LIKE A CALL BACK TO GET AN ESTIMATE OF HOW LONG IT TAKES TO RECEIVE RESULTS. PATIENT'S  ALSO ADVISED THAT THIS COMMUNICATION COULD BE SENT THROUGH [facility].

## 2024-02-13 ENCOUNTER — TELEMEDICINE (OUTPATIENT)
Dept: FAMILY MEDICINE CLINIC | Facility: CLINIC | Age: 49
End: 2024-02-13
Payer: COMMERCIAL

## 2024-02-13 DIAGNOSIS — B97.7 HPV (HUMAN PAPILLOMA VIRUS) INFECTION: Primary | Chronic | ICD-10-CM

## 2024-02-13 DIAGNOSIS — E78.2 MIXED HYPERLIPIDEMIA: Chronic | ICD-10-CM

## 2024-02-14 LAB
CYTOLOGIST CVX/VAG CYTO: ABNORMAL
CYTOLOGY CVX/VAG DOC CYTO: ABNORMAL
CYTOLOGY CVX/VAG DOC THIN PREP: ABNORMAL
DX ICD CODE: ABNORMAL
HIV 1 & 2 AB SER-IMP: ABNORMAL
HPV GENOTYPE REFLEX: ABNORMAL
HPV I/H RISK 4 DNA CVX QL PROBE+SIG AMP: POSITIVE
HPV16 DNA CVX QL PROBE+SIG AMP: NEGATIVE
HPV18+45 E6+E7 MRNA CVX QL NAA+PROBE: POSITIVE
OTHER STN SPEC: ABNORMAL
PATHOLOGIST CVX/VAG CYTO: ABNORMAL
STAT OF ADQ CVX/VAG CYTO-IMP: ABNORMAL

## 2024-02-18 DIAGNOSIS — F41.9 ANXIETY: Chronic | ICD-10-CM

## 2024-02-19 RX ORDER — HYDROXYZINE HYDROCHLORIDE 25 MG/1
TABLET, FILM COATED ORAL
Qty: 30 TABLET | Refills: 0 | Status: SHIPPED | OUTPATIENT
Start: 2024-02-19

## 2024-02-22 ENCOUNTER — HOSPITAL ENCOUNTER (OUTPATIENT)
Facility: HOSPITAL | Age: 49
Setting detail: HOSPITAL OUTPATIENT SURGERY
Discharge: HOME | End: 2024-02-22
Attending: INTERNAL MEDICINE | Admitting: INTERNAL MEDICINE
Payer: COMMERCIAL

## 2024-02-22 ENCOUNTER — ANESTHESIA (OUTPATIENT)
Dept: PERIOP | Facility: HOSPITAL | Age: 49
End: 2024-02-22
Payer: COMMERCIAL

## 2024-02-22 ENCOUNTER — ANESTHESIA EVENT (OUTPATIENT)
Dept: PERIOP | Facility: HOSPITAL | Age: 49
End: 2024-02-22
Payer: COMMERCIAL

## 2024-02-22 VITALS
HEART RATE: 65 BPM | SYSTOLIC BLOOD PRESSURE: 117 MMHG | HEIGHT: 61 IN | DIASTOLIC BLOOD PRESSURE: 86 MMHG | WEIGHT: 170 LBS | RESPIRATION RATE: 16 BRPM | OXYGEN SATURATION: 99 % | BODY MASS INDEX: 32.1 KG/M2 | TEMPERATURE: 98.3 F

## 2024-02-22 DIAGNOSIS — Z12.11 ENCOUNTER FOR SCREENING FOR MALIGNANT NEOPLASM OF COLON: ICD-10-CM

## 2024-02-22 LAB
B-HCG UR QL: NEGATIVE
EXPIRATION DATE: NORMAL
INTERNAL NEGATIVE CONTROL: NEGATIVE
INTERNAL POSITIVE CONTROL: POSITIVE
Lab: NORMAL

## 2024-02-22 PROCEDURE — 81025 URINE PREGNANCY TEST: CPT | Performed by: INTERNAL MEDICINE

## 2024-02-22 PROCEDURE — 25810000003 LACTATED RINGERS PER 1000 ML: Performed by: ANESTHESIOLOGY

## 2024-02-22 PROCEDURE — 25010000002 PROPOFOL 200 MG/20ML EMULSION: Performed by: NURSE ANESTHETIST, CERTIFIED REGISTERED

## 2024-02-22 PROCEDURE — 45378 DIAGNOSTIC COLONOSCOPY: CPT | Performed by: INTERNAL MEDICINE

## 2024-02-22 RX ORDER — IPRATROPIUM BROMIDE AND ALBUTEROL SULFATE 2.5; .5 MG/3ML; MG/3ML
3 SOLUTION RESPIRATORY (INHALATION) ONCE AS NEEDED
Status: DISCONTINUED | OUTPATIENT
Start: 2024-02-22 | End: 2024-02-22 | Stop reason: HOSPADM

## 2024-02-22 RX ORDER — MIDAZOLAM HYDROCHLORIDE 1 MG/ML
1 INJECTION INTRAMUSCULAR; INTRAVENOUS
Status: DISCONTINUED | OUTPATIENT
Start: 2024-02-22 | End: 2024-02-22 | Stop reason: HOSPADM

## 2024-02-22 RX ORDER — LIDOCAINE HYDROCHLORIDE 20 MG/ML
INJECTION, SOLUTION EPIDURAL; INFILTRATION; INTRACAUDAL; PERINEURAL AS NEEDED
Status: DISCONTINUED | OUTPATIENT
Start: 2024-02-22 | End: 2024-02-22 | Stop reason: SURG

## 2024-02-22 RX ORDER — SODIUM CHLORIDE 0.9 % (FLUSH) 0.9 %
10 SYRINGE (ML) INJECTION EVERY 12 HOURS SCHEDULED
Status: DISCONTINUED | OUTPATIENT
Start: 2024-02-22 | End: 2024-02-22 | Stop reason: HOSPADM

## 2024-02-22 RX ORDER — PROPOFOL 10 MG/ML
INJECTION, EMULSION INTRAVENOUS CONTINUOUS PRN
Status: DISCONTINUED | OUTPATIENT
Start: 2024-02-22 | End: 2024-02-22 | Stop reason: SURG

## 2024-02-22 RX ORDER — KETOROLAC TROMETHAMINE 30 MG/ML
30 INJECTION, SOLUTION INTRAMUSCULAR; INTRAVENOUS EVERY 6 HOURS PRN
Status: DISCONTINUED | OUTPATIENT
Start: 2024-02-22 | End: 2024-02-22 | Stop reason: HOSPADM

## 2024-02-22 RX ORDER — MEPERIDINE HYDROCHLORIDE 25 MG/ML
12.5 INJECTION INTRAMUSCULAR; INTRAVENOUS; SUBCUTANEOUS
Status: DISCONTINUED | OUTPATIENT
Start: 2024-02-22 | End: 2024-02-22 | Stop reason: HOSPADM

## 2024-02-22 RX ORDER — SODIUM CHLORIDE 0.9 % (FLUSH) 0.9 %
10 SYRINGE (ML) INJECTION AS NEEDED
Status: DISCONTINUED | OUTPATIENT
Start: 2024-02-22 | End: 2024-02-22 | Stop reason: HOSPADM

## 2024-02-22 RX ORDER — FENTANYL CITRATE 50 UG/ML
50 INJECTION, SOLUTION INTRAMUSCULAR; INTRAVENOUS
Status: DISCONTINUED | OUTPATIENT
Start: 2024-02-22 | End: 2024-02-22 | Stop reason: HOSPADM

## 2024-02-22 RX ORDER — SODIUM CHLORIDE, SODIUM LACTATE, POTASSIUM CHLORIDE, CALCIUM CHLORIDE 600; 310; 30; 20 MG/100ML; MG/100ML; MG/100ML; MG/100ML
100 INJECTION, SOLUTION INTRAVENOUS ONCE AS NEEDED
Status: DISCONTINUED | OUTPATIENT
Start: 2024-02-22 | End: 2024-02-22 | Stop reason: HOSPADM

## 2024-02-22 RX ORDER — OXYCODONE HYDROCHLORIDE AND ACETAMINOPHEN 5; 325 MG/1; MG/1
1 TABLET ORAL ONCE AS NEEDED
Status: DISCONTINUED | OUTPATIENT
Start: 2024-02-22 | End: 2024-02-22 | Stop reason: HOSPADM

## 2024-02-22 RX ORDER — SODIUM CHLORIDE 9 MG/ML
40 INJECTION, SOLUTION INTRAVENOUS AS NEEDED
Status: DISCONTINUED | OUTPATIENT
Start: 2024-02-22 | End: 2024-02-22 | Stop reason: HOSPADM

## 2024-02-22 RX ORDER — ONDANSETRON 2 MG/ML
4 INJECTION INTRAMUSCULAR; INTRAVENOUS AS NEEDED
Status: DISCONTINUED | OUTPATIENT
Start: 2024-02-22 | End: 2024-02-22 | Stop reason: HOSPADM

## 2024-02-22 RX ORDER — SODIUM CHLORIDE, SODIUM LACTATE, POTASSIUM CHLORIDE, CALCIUM CHLORIDE 600; 310; 30; 20 MG/100ML; MG/100ML; MG/100ML; MG/100ML
125 INJECTION, SOLUTION INTRAVENOUS ONCE
Status: COMPLETED | OUTPATIENT
Start: 2024-02-22 | End: 2024-02-22

## 2024-02-22 RX ADMIN — SODIUM CHLORIDE, POTASSIUM CHLORIDE, SODIUM LACTATE AND CALCIUM CHLORIDE: 600; 310; 30; 20 INJECTION, SOLUTION INTRAVENOUS at 10:05

## 2024-02-22 RX ADMIN — PROPOFOL 200 MCG/KG/MIN: 10 INJECTION, EMULSION INTRAVENOUS at 10:06

## 2024-02-22 RX ADMIN — LIDOCAINE HYDROCHLORIDE 60 MG: 20 INJECTION, SOLUTION EPIDURAL; INFILTRATION; INTRACAUDAL; PERINEURAL at 10:06

## 2024-02-22 NOTE — H&P
[facility] HOSPITALIST HISTORY AND PHYSICAL    Patient Identification:  Name:  Annalisa Whittington  Age:  48 y.o.  Sex:  female  :  1975  MRN:  [id number]   Visit Number:  [id number]  Primary Care Physician:  Mariah Kwan APRN     Chief complaint: Screening colonoscopy    History of presenting illness: Ms. Whittington is a 48 y.o. female who presents today for screening colonoscopy. This will be her first colonoscopy. She denies family history of colon cancer. She denies difficulty with diarrhea or constipation. Denies melena or BRBPR. Denies abdominal pain or unusual weight loss. She has no other complaints today.     Review of Systems   Constitutional: Negative.    HENT: Negative.     Eyes: Negative.    Respiratory: Negative.     Cardiovascular: Negative.    Gastrointestinal: Negative.    Endocrine: Negative.    Genitourinary: Negative.    Musculoskeletal: Negative.    Allergic/Immunologic: Negative.    Neurological: Negative.    Hematological: Negative.         Past Medical History:   Diagnosis Date    Actinomycosis     Allergic     Anxiety     Depression     Hypothyroidism 2023    Kidney stone     Mixed hyperlipidemia 2024     Past Surgical History:   Procedure Laterality Date    BRONCHOSCOPY      COLPOSCOPY      CYSTOSCOPY W/ LASER LITHOTRIPSY      EXTRACORPOREAL SHOCK WAVE LITHOTRIPSY (ESWL) Right 2019    Procedure: EXTRACORPOREAL SHOCKWAVE LITHOTRIPSY;  Surgeon: Vivek Tse MD;  Location: [facility];  Service: Urology     Family History   Problem Relation Age of Onset    Breast cancer Maternal Grandmother     Diabetes Father     Hypertension Mother      Social History     Socioeconomic History    Marital status:    Tobacco Use    Smoking status: Never    Smokeless tobacco: Former     Types: Snuff   Substance and Sexual Activity    Alcohol use: Yes     Comment: OCASSIONAL    Drug use: No    Sexual activity: Yes       Allergies:  Bee venom, Mold extract  "[trichophyton], Latex, Pollen extract, and Shrimp    Prior to Admission Medications       Prescriptions Last Dose Informant Patient Reported? Taking?    busPIRone (BUSPAR) 10 MG tablet   Yes No    Take 1 tablet by mouth 2 (Two) Times a Day.    FLUoxetine (PROzac) 40 MG capsule   No No    Take 1 capsule by mouth Every Morning.    hydrOXYzine (ATARAX) 25 MG tablet   No No    TAKE 1 TABLET BY MOUTH THREE TIMES DAILY AS NEEDED FOR ANXIETY    levothyroxine (SYNTHROID, LEVOTHROID) 25 MCG tablet   No No    Take 1 tablet by mouth Daily.    loratadine (Claritin) 10 MG tablet   No No    Take 1 tablet by mouth Daily.    losartan (Cozaar) 100 MG tablet   No No    Take 1 tablet by mouth Daily.    metoprolol succinate XL (Toprol XL) 25 MG 24 hr tablet   No No    Take 1 tablet by mouth Daily.    Multiple Vitamins-Minerals (CENTRUM ADULTS PO)   Yes No    Take  by mouth.    pravastatin (Pravachol) 40 MG tablet   No No    Take 1 tablet by mouth Daily.        Vital Signs:  Vitals:    02/22/24 0926   BP: 124/76   BP Location: Left arm   Patient Position: Sitting   Pulse: 67   Resp: 16   Temp: 98.4 °F (36.9 °C)   TempSrc: Oral   SpO2: 99%   Weight: 7.711 kg (17 lb)   Height: 154.9 cm (61\")         Physical Exam:  Constitutional:  Alert and oriented. Well developed and well nourished, in no acute distress.  HENT:  Head: Normocephalic and atraumatic.  Mouth:  Moist mucous membranes.  OP clear, mmm  Eyes:  Conjunctivae and EOM are normal.  Pupils are equal, round, and reactive to light.  No scleral icterus.  Neck:  Neck supple.  No JVD present.    Cardiovascular:  RRR, no MRG.  Pulmonary/Chest:  CTAB, unlabored.   Abdominal:  Soft.  Bowel sounds are normal.  No distension and no tenderness.   Musculoskeletal:  No edema, no tenderness, and no deformity.   Neurological:  MS as above, grossly nonfocal exam     Assessment and Plan:  Proceed with screening colonoscopy.     Jeanette Leblanc, APRN  02/22/24  09:04 EST  "

## 2024-02-22 NOTE — ANESTHESIA PREPROCEDURE EVALUATION
Anesthesia Evaluation     Patient summary reviewed and Nursing notes reviewed   no history of anesthetic complications:                Airway   Mallampati: I  TM distance: >3 FB  Neck ROM: full  No difficulty expected  Dental - normal exam     Pulmonary - negative pulmonary ROS and normal exam   Cardiovascular - normal exam  Exercise tolerance: good (4-7 METS)    NYHA Classification: II  Beta blocker given within 24 hours of surgery    (+) hyperlipidemia      Neuro/Psych  (+) psychiatric history  GI/Hepatic/Renal/Endo    (+) renal disease-, thyroid problem     Musculoskeletal (-) negative ROS    Abdominal  - normal exam    Bowel sounds: normal.   Substance History - negative use     OB/GYN negative ob/gyn ROS         Other - negative ROS                   Anesthesia Plan    ASA 2     general     intravenous induction     Anesthetic plan, risks, benefits, and alternatives have been provided, discussed and informed consent has been obtained with: patient.    CODE STATUS:

## 2024-02-22 NOTE — ANESTHESIA POSTPROCEDURE EVALUATION
Patient: Annalisa Whittington    Procedure Summary       Date: 02/22/24 Room / Location:  [facility]  /  [facility]    Anesthesia Start: 1005 Anesthesia Stop: 1024    Procedure: COLONOSCOPY Diagnosis:       Encounter for screening for malignant neoplasm of colon      (Encounter for screening for malignant neoplasm of colon [Z12.11])    Surgeons: Rhina Lynn MD Provider: Mick Perez DO    Anesthesia Type: general ASA Status: 2            Anesthesia Type: general    Vitals  Vitals Value Taken Time   /86 02/22/24 1050   Temp 98.3 °F (36.8 °C) 02/22/24 1025   Pulse 65 02/22/24 1050   Resp 16 02/22/24 1050   SpO2 99 % 02/22/24 1050           Post Anesthesia Care and Evaluation    Patient location during evaluation: bedside  Patient participation: complete - patient participated  Level of consciousness: awake and alert  Pain score: 1  Pain management: adequate    Airway patency: patent  Anesthetic complications: No anesthetic complications  PONV Status: none  Cardiovascular status: acceptable  Respiratory status: acceptable  Hydration status: acceptable

## 2024-03-13 ENCOUNTER — TELEPHONE (OUTPATIENT)
Dept: FAMILY MEDICINE CLINIC | Facility: CLINIC | Age: 49
End: 2024-03-13

## 2024-03-13 NOTE — TELEPHONE ENCOUNTER
Caller: ERENDIRA AZEVEDO    Relationship: Other    Best call back number: [phone]     Caller requesting test results: ERENDIRA - [facility]    What test was performed: RECENT LABS        FAX #  [phone]

## 2024-04-06 DIAGNOSIS — F41.9 ANXIETY: Chronic | ICD-10-CM

## 2024-04-06 DIAGNOSIS — E03.9 HYPOTHYROIDISM, UNSPECIFIED TYPE: Chronic | ICD-10-CM

## 2024-04-08 DIAGNOSIS — F41.9 ANXIETY: Chronic | ICD-10-CM

## 2024-04-08 DIAGNOSIS — E03.9 HYPOTHYROIDISM, UNSPECIFIED TYPE: Chronic | ICD-10-CM

## 2024-04-08 RX ORDER — LEVOTHYROXINE SODIUM 0.03 MG/1
25 TABLET ORAL DAILY
Qty: 90 TABLET | Refills: 0 | Status: SHIPPED | OUTPATIENT
Start: 2024-04-08 | End: 2024-04-08 | Stop reason: SDUPTHER

## 2024-04-08 RX ORDER — HYDROXYZINE HYDROCHLORIDE 25 MG/1
TABLET, FILM COATED ORAL
Qty: 30 TABLET | Refills: 0 | Status: SHIPPED | OUTPATIENT
Start: 2024-04-08 | End: 2024-04-08 | Stop reason: SDUPTHER

## 2024-04-09 RX ORDER — LEVOTHYROXINE SODIUM 0.03 MG/1
25 TABLET ORAL DAILY
Qty: 90 TABLET | Refills: 0 | Status: SHIPPED | OUTPATIENT
Start: 2024-04-09

## 2024-04-09 RX ORDER — HYDROXYZINE HYDROCHLORIDE 25 MG/1
25 TABLET, FILM COATED ORAL 3 TIMES DAILY PRN
Qty: 30 TABLET | Refills: 0 | Status: SHIPPED | OUTPATIENT
Start: 2024-04-09

## 2024-04-15 DIAGNOSIS — E03.9 HYPOTHYROIDISM, UNSPECIFIED TYPE: Primary | Chronic | ICD-10-CM

## 2024-04-15 DIAGNOSIS — E78.2 MIXED HYPERLIPIDEMIA: Chronic | ICD-10-CM

## 2024-05-06 ENCOUNTER — PRIOR AUTHORIZATION (OUTPATIENT)
Dept: FAMILY MEDICINE CLINIC | Facility: CLINIC | Age: 49
End: 2024-05-06
Payer: COMMERCIAL

## 2024-05-06 DIAGNOSIS — E66.9 OBESITY (BMI 30-39.9): Primary | ICD-10-CM

## 2024-05-09 ENCOUNTER — CLINICAL SUPPORT (OUTPATIENT)
Dept: FAMILY MEDICINE CLINIC | Facility: CLINIC | Age: 49
End: 2024-05-09
Payer: COMMERCIAL

## 2024-05-09 DIAGNOSIS — E03.9 HYPOTHYROIDISM, UNSPECIFIED TYPE: Chronic | ICD-10-CM

## 2024-05-09 LAB
ALBUMIN SERPL-MCNC: 4.2 G/DL (ref 3.5–5.2)
ALBUMIN/GLOB SERPL: 1.7 G/DL
ALP SERPL-CCNC: 80 U/L (ref 39–117)
ALT SERPL W P-5'-P-CCNC: 26 U/L (ref 1–33)
ANION GAP SERPL CALCULATED.3IONS-SCNC: 12.8 MMOL/L (ref 5–15)
AST SERPL-CCNC: 23 U/L (ref 1–32)
BASOPHILS # BLD AUTO: 0.06 10*3/MM3 (ref 0–0.2)
BASOPHILS NFR BLD AUTO: 0.8 % (ref 0–1.5)
BILIRUB SERPL-MCNC: 0.2 MG/DL (ref 0–1.2)
BUN SERPL-MCNC: 19 MG/DL (ref 6–20)
BUN/CREAT SERPL: 20.9 (ref 7–25)
CALCIUM SPEC-SCNC: 8.9 MG/DL (ref 8.6–10.5)
CHLORIDE SERPL-SCNC: 103 MMOL/L (ref 98–107)
CHOLEST SERPL-MCNC: 182 MG/DL (ref 0–200)
CO2 SERPL-SCNC: 20.2 MMOL/L (ref 22–29)
CREAT SERPL-MCNC: 0.91 MG/DL (ref 0.57–1)
DEPRECATED RDW RBC AUTO: 39.6 FL (ref 37–54)
EGFRCR SERPLBLD CKD-EPI 2021: 79 ML/MIN/1.73
EOSINOPHIL # BLD AUTO: 0.15 10*3/MM3 (ref 0–0.4)
EOSINOPHIL NFR BLD AUTO: 2.1 % (ref 0.3–6.2)
ERYTHROCYTE [DISTWIDTH] IN BLOOD BY AUTOMATED COUNT: 11.6 % (ref 12.3–15.4)
GLOBULIN UR ELPH-MCNC: 2.5 GM/DL
GLUCOSE SERPL-MCNC: 74 MG/DL (ref 65–99)
HCT VFR BLD AUTO: 37.9 % (ref 34–46.6)
HDLC SERPL-MCNC: 38 MG/DL (ref 40–60)
HGB BLD-MCNC: 12.7 G/DL (ref 12–15.9)
IMM GRANULOCYTES # BLD AUTO: 0.04 10*3/MM3 (ref 0–0.05)
IMM GRANULOCYTES NFR BLD AUTO: 0.6 % (ref 0–0.5)
LDLC SERPL CALC-MCNC: 123 MG/DL (ref 0–100)
LDLC/HDLC SERPL: 3.19 {RATIO}
LYMPHOCYTES # BLD AUTO: 3.14 10*3/MM3 (ref 0.7–3.1)
LYMPHOCYTES NFR BLD AUTO: 43.6 % (ref 19.6–45.3)
MCH RBC QN AUTO: 31.4 PG (ref 26.6–33)
MCHC RBC AUTO-ENTMCNC: 33.5 G/DL (ref 31.5–35.7)
MCV RBC AUTO: 93.8 FL (ref 79–97)
MONOCYTES # BLD AUTO: 0.66 10*3/MM3 (ref 0.1–0.9)
MONOCYTES NFR BLD AUTO: 9.2 % (ref 5–12)
NEUTROPHILS NFR BLD AUTO: 3.15 10*3/MM3 (ref 1.7–7)
NEUTROPHILS NFR BLD AUTO: 43.7 % (ref 42.7–76)
NRBC BLD AUTO-RTO: 0 /100 WBC (ref 0–0.2)
PLATELET # BLD AUTO: 327 10*3/MM3 (ref 140–450)
PMV BLD AUTO: 11.2 FL (ref 6–12)
POTASSIUM SERPL-SCNC: 3.9 MMOL/L (ref 3.5–5.2)
PROT SERPL-MCNC: 6.7 G/DL (ref 6–8.5)
RBC # BLD AUTO: 4.04 10*6/MM3 (ref 3.77–5.28)
SODIUM SERPL-SCNC: 136 MMOL/L (ref 136–145)
T4 FREE SERPL-MCNC: 1.08 NG/DL (ref 0.93–1.7)
TRIGL SERPL-MCNC: 114 MG/DL (ref 0–150)
TSH SERPL DL<=0.05 MIU/L-ACNC: 3.33 UIU/ML (ref 0.27–4.2)
VLDLC SERPL-MCNC: 21 MG/DL (ref 5–40)
WBC NRBC COR # BLD AUTO: 7.2 10*3/MM3 (ref 3.4–10.8)

## 2024-05-09 PROCEDURE — 80061 LIPID PANEL: CPT | Performed by: NURSE PRACTITIONER

## 2024-05-09 PROCEDURE — 36415 COLL VENOUS BLD VENIPUNCTURE: CPT | Performed by: NURSE PRACTITIONER

## 2024-05-09 PROCEDURE — 80050 GENERAL HEALTH PANEL: CPT | Performed by: NURSE PRACTITIONER

## 2024-05-09 PROCEDURE — 84439 ASSAY OF FREE THYROXINE: CPT | Performed by: NURSE PRACTITIONER

## 2024-05-13 ENCOUNTER — OFFICE VISIT (OUTPATIENT)
Dept: FAMILY MEDICINE CLINIC | Facility: CLINIC | Age: 49
End: 2024-05-13
Payer: COMMERCIAL

## 2024-05-13 VITALS
TEMPERATURE: 97.8 F | HEART RATE: 78 BPM | WEIGHT: 169 LBS | BODY MASS INDEX: 31.91 KG/M2 | HEIGHT: 61 IN | DIASTOLIC BLOOD PRESSURE: 68 MMHG | SYSTOLIC BLOOD PRESSURE: 96 MMHG | RESPIRATION RATE: 18 BRPM | OXYGEN SATURATION: 98 %

## 2024-05-13 DIAGNOSIS — I10 ESSENTIAL HYPERTENSION: Chronic | ICD-10-CM

## 2024-05-13 DIAGNOSIS — J30.2 SEASONAL ALLERGIC RHINITIS, UNSPECIFIED TRIGGER: ICD-10-CM

## 2024-05-13 DIAGNOSIS — E03.9 HYPOTHYROIDISM, UNSPECIFIED TYPE: Chronic | ICD-10-CM

## 2024-05-13 DIAGNOSIS — F41.9 ANXIETY: Chronic | ICD-10-CM

## 2024-05-13 PROCEDURE — 1125F AMNT PAIN NOTED PAIN PRSNT: CPT | Performed by: NURSE PRACTITIONER

## 2024-05-13 PROCEDURE — 99213 OFFICE O/P EST LOW 20 MIN: CPT | Performed by: NURSE PRACTITIONER

## 2024-05-13 RX ORDER — LORATADINE 10 MG/1
10 TABLET ORAL DAILY
Qty: 90 TABLET | Refills: 2 | Status: SHIPPED | OUTPATIENT
Start: 2024-05-13

## 2024-05-13 RX ORDER — LEVOTHYROXINE SODIUM 0.03 MG/1
25 TABLET ORAL DAILY
Qty: 90 TABLET | Refills: 0 | Status: SHIPPED | OUTPATIENT
Start: 2024-05-13

## 2024-05-13 RX ORDER — LOSARTAN POTASSIUM 100 MG/1
100 TABLET ORAL DAILY
Qty: 90 TABLET | Refills: 2 | Status: SHIPPED | OUTPATIENT
Start: 2024-05-13

## 2024-05-13 RX ORDER — METOPROLOL SUCCINATE 25 MG/1
25 TABLET, EXTENDED RELEASE ORAL DAILY
Qty: 90 TABLET | Refills: 1 | Status: SHIPPED | OUTPATIENT
Start: 2024-05-13

## 2024-05-13 RX ORDER — FLUOXETINE HYDROCHLORIDE 40 MG/1
40 CAPSULE ORAL EVERY MORNING
Qty: 90 CAPSULE | Refills: 1 | Status: SHIPPED | OUTPATIENT
Start: 2024-05-13 | End: 2024-05-20

## 2024-05-13 RX ORDER — HYDROXYZINE HYDROCHLORIDE 25 MG/1
25 TABLET, FILM COATED ORAL 3 TIMES DAILY PRN
Qty: 90 TABLET | Refills: 0 | Status: SHIPPED | OUTPATIENT
Start: 2024-05-13

## 2024-05-13 RX ORDER — BUSPIRONE HYDROCHLORIDE 10 MG/1
10 TABLET ORAL 2 TIMES DAILY
Qty: 180 TABLET | Refills: 1 | Status: SHIPPED | OUTPATIENT
Start: 2024-05-13

## 2024-05-13 NOTE — PROGRESS NOTES
Patient Name: Annalisa Whittington Today's Date: 2024   Patient MRN / CSN: [id number] / [id number] Date of Encounter: 2024   Patient Age / : 48 y.o. / 1975 Encounter Provider: ROCKY Solomon   Referring Physician: No ref. provider found          Annalisa is a 48 y.o. female who is being seen today for Hypertension      Hypertension  This is a chronic problem. The current episode started more than 1 year ago. The problem has been improved since onset. The problem is controlled. Current antihypertension treatment includes angiotensin blockers and beta blockers. The current treatment provides significant improvement.       Allergies include:Bee venom, Mold extract [trichophyton], Latex, Pollen extract, and Shrimp  Current Outpatient Medications   Medication Sig Dispense Refill    busPIRone (BUSPAR) 10 MG tablet Take 1 tablet by mouth 2 (Two) Times a Day. 180 tablet 1    FLUoxetine (PROzac) 40 MG capsule Take 1 capsule by mouth Every Morning. 90 capsule 1    hydrOXYzine (ATARAX) 25 MG tablet Take 1 tablet by mouth 3 (Three) Times a Day As Needed for Anxiety. 90 tablet 0    levothyroxine (SYNTHROID, LEVOTHROID) 25 MCG tablet Take 1 tablet by mouth Daily. 90 tablet 0    loratadine (Claritin) 10 MG tablet Take 1 tablet by mouth Daily. 90 tablet 2    losartan (Cozaar) 100 MG tablet Take 1 tablet by mouth Daily. 90 tablet 2    metoprolol succinate XL (Toprol XL) 25 MG 24 hr tablet Take 1 tablet by mouth Daily. 90 tablet 1    Multiple Vitamins-Minerals (CENTRUM ADULTS PO) Take  by mouth.      pravastatin (Pravachol) 40 MG tablet Take 1 tablet by mouth Daily. 90 tablet 1     No current facility-administered medications for this visit.     Past Medical History:   Diagnosis Date    Actinomycosis     Allergic     Anxiety     Depression     Hypothyroidism 2023    Kidney stone     Mixed hyperlipidemia 2024     Family History   Problem Relation Age of Onset    Breast cancer Maternal Grandmother     Diabetes  "Father     Hypertension Mother      Past Surgical History:   Procedure Laterality Date    BRONCHOSCOPY      COLONOSCOPY N/A 2/22/2024    Procedure: COLONOSCOPY;  Surgeon: Rhina Lynn MD;  Location: [facility] OR;  Service: Gastroenterology;  Laterality: N/A;    COLPOSCOPY      CYSTOSCOPY W/ LASER LITHOTRIPSY      EXTRACORPOREAL SHOCK WAVE LITHOTRIPSY (ESWL) Right 5/31/2019    Procedure: EXTRACORPOREAL SHOCKWAVE LITHOTRIPSY;  Surgeon: Vivek Tse MD;  Location: [facility] OR;  Service: Urology     Social History     Substance and Sexual Activity   Alcohol Use Yes    Comment: OCASSIONAL     Social History     Tobacco Use   Smoking Status Never   Smokeless Tobacco Former    Types: Snuff     Social History     Substance and Sexual Activity   Drug Use No     Review of Systems   Constitutional: Negative.    HENT: Negative.     Respiratory: Negative.     Cardiovascular: Negative.    Gastrointestinal: Negative.    Psychiatric/Behavioral:  The patient is nervous/anxious.         Depression Assessment Review:  PHQ-9 Total Score:    Vital Signs & Measurements Taken This Encounter  BP 96/68 (BP Location: Right arm, Patient Position: Sitting, Cuff Size: Adult)   Pulse 78   Temp 97.8 °F (36.6 °C) (Temporal)   Resp 18   Ht 154.9 cm (60.98\")   Wt 76.7 kg (169 lb)   SpO2 98%   BMI 31.95 kg/m²    SpO2 Percentage    05/13/24 1627   SpO2: 98%               Physical Exam  Constitutional:       Appearance: Normal appearance.   HENT:      Head: Normocephalic and atraumatic.      Right Ear: External ear normal.      Left Ear: External ear normal.      Mouth/Throat:      Mouth: Mucous membranes are moist.   Eyes:      Pupils: Pupils are equal, round, and reactive to light.   Cardiovascular:      Rate and Rhythm: Normal rate and regular rhythm.      Pulses: Normal pulses.      Heart sounds: Normal heart sounds.   Pulmonary:      Effort: Pulmonary effort is normal.      Breath sounds: Normal breath sounds. "   Abdominal:      Palpations: Abdomen is soft.   Musculoskeletal:         General: Normal range of motion.   Skin:     General: Skin is warm.   Neurological:      General: No focal deficit present.      Mental Status: She is alert and oriented to person, place, and time.   Psychiatric:         Mood and Affect: Mood normal.         Behavior: Behavior normal.          Fall Risk Assessment:  Fall Risk Assessment was completed, and patient is at low risk for falls.    Assessment & Plan  Patient Active Problem List   Diagnosis    Pulmonary nodules    Kidney stones    Hypothyroidism    Encounter for screening for malignant neoplasm of colon    HPV (human papilloma virus) infection    Mixed hyperlipidemia       ICD-10-CM ICD-9-CM   1. Anxiety  F41.9 300.00   2. Hypothyroidism, unspecified type  E03.9 244.9   3. Seasonal allergic rhinitis, unspecified trigger  J30.2 477.9   4. Essential hypertension  I10 401.9     No orders of the defined types were placed in this encounter.      Meds Ordered During Visit:  New Medications Ordered This Visit   Medications    busPIRone (BUSPAR) 10 MG tablet     Sig: Take 1 tablet by mouth 2 (Two) Times a Day.     Dispense:  180 tablet     Refill:  1    FLUoxetine (PROzac) 40 MG capsule     Sig: Take 1 capsule by mouth Every Morning.     Dispense:  90 capsule     Refill:  1    hydrOXYzine (ATARAX) 25 MG tablet     Sig: Take 1 tablet by mouth 3 (Three) Times a Day As Needed for Anxiety.     Dispense:  90 tablet     Refill:  0    levothyroxine (SYNTHROID, LEVOTHROID) 25 MCG tablet     Sig: Take 1 tablet by mouth Daily.     Dispense:  90 tablet     Refill:  0    loratadine (Claritin) 10 MG tablet     Sig: Take 1 tablet by mouth Daily.     Dispense:  90 tablet     Refill:  2    losartan (Cozaar) 100 MG tablet     Sig: Take 1 tablet by mouth Daily.     Dispense:  90 tablet     Refill:  2    metoprolol succinate XL (Toprol XL) 25 MG 24 hr tablet     Sig: Take 1 tablet by mouth Daily.     Dispense:   90 tablet     Refill:  1     -- She presents today for follow-up valuation.  She states all in all she has been doing well.  Her current medications are treating her hypertension well.  Her anxiety medicine is stabilizing her moods well without side effect.  We also discussed her recent lab work and no recommended changes will be made to her medications.    Follow-up in 6 months.            This document has been electronically signed by ROCKY Solomon  May 14, 2024 09:55 EDT    ROCKY Solomon  [address] [place] [address] [phone] (office)    Part of this note may be an electronic transcription/translation of spoken language to printed text using the Dragon Dictation System.

## 2024-05-20 RX ORDER — FLUOXETINE HYDROCHLORIDE 20 MG/1
20 CAPSULE ORAL DAILY
Qty: 90 CAPSULE | Refills: 1 | Status: SHIPPED | OUTPATIENT
Start: 2024-05-20

## 2024-09-24 DIAGNOSIS — F41.9 ANXIETY: Chronic | ICD-10-CM

## 2024-09-24 RX ORDER — HYDROXYZINE HYDROCHLORIDE 25 MG/1
25 TABLET, FILM COATED ORAL 3 TIMES DAILY PRN
Qty: 90 TABLET | Refills: 0 | Status: SHIPPED | OUTPATIENT
Start: 2024-09-24

## 2024-10-07 DIAGNOSIS — E03.9 HYPOTHYROIDISM, UNSPECIFIED TYPE: Primary | Chronic | ICD-10-CM

## 2024-10-07 DIAGNOSIS — T78.40XA ALLERGY, INITIAL ENCOUNTER: ICD-10-CM

## 2024-10-07 DIAGNOSIS — E78.2 MIXED HYPERLIPIDEMIA: Chronic | ICD-10-CM

## 2024-10-22 ENCOUNTER — CLINICAL SUPPORT (OUTPATIENT)
Dept: FAMILY MEDICINE CLINIC | Facility: CLINIC | Age: 49
End: 2024-10-22
Payer: COMMERCIAL

## 2024-10-22 DIAGNOSIS — T78.40XA ALLERGY, INITIAL ENCOUNTER: ICD-10-CM

## 2024-10-22 PROCEDURE — 86003 ALLG SPEC IGE CRUDE XTRC EA: CPT | Performed by: NURSE PRACTITIONER

## 2024-10-22 PROCEDURE — 36415 COLL VENOUS BLD VENIPUNCTURE: CPT | Performed by: NURSE PRACTITIONER

## 2024-10-22 PROCEDURE — 84439 ASSAY OF FREE THYROXINE: CPT | Performed by: NURSE PRACTITIONER

## 2024-10-22 PROCEDURE — 86008 ALLG SPEC IGE RECOMB EA: CPT | Performed by: NURSE PRACTITIONER

## 2024-10-22 PROCEDURE — 82785 ASSAY OF IGE: CPT | Performed by: NURSE PRACTITIONER

## 2024-10-22 PROCEDURE — 80050 GENERAL HEALTH PANEL: CPT | Performed by: NURSE PRACTITIONER

## 2024-10-22 PROCEDURE — 80061 LIPID PANEL: CPT | Performed by: NURSE PRACTITIONER

## 2024-10-22 NOTE — PROGRESS NOTES
Venipuncture Blood Specimen Collection  Venipuncture performed in right arm by Carolyn Grace MA with good hemostasis. Patient tolerated the procedure well without complications.   10/22/24   Carolyn Grace MA

## 2024-10-23 LAB
ALBUMIN SERPL-MCNC: 3.9 G/DL (ref 3.5–5.2)
ALBUMIN/GLOB SERPL: 1.5 G/DL
ALP SERPL-CCNC: 64 U/L (ref 39–117)
ALT SERPL W P-5'-P-CCNC: 16 U/L (ref 1–33)
ANION GAP SERPL CALCULATED.3IONS-SCNC: 8.1 MMOL/L (ref 5–15)
AST SERPL-CCNC: 19 U/L (ref 1–32)
BASOPHILS # BLD AUTO: 0.06 10*3/MM3 (ref 0–0.2)
BASOPHILS NFR BLD AUTO: 1.3 % (ref 0–1.5)
BILIRUB SERPL-MCNC: 0.6 MG/DL (ref 0–1.2)
BUN SERPL-MCNC: 8 MG/DL (ref 6–20)
BUN/CREAT SERPL: 9 (ref 7–25)
CALCIUM SPEC-SCNC: 8.9 MG/DL (ref 8.6–10.5)
CHLORIDE SERPL-SCNC: 104 MMOL/L (ref 98–107)
CHOLEST SERPL-MCNC: 183 MG/DL (ref 0–200)
CO2 SERPL-SCNC: 25.9 MMOL/L (ref 22–29)
CREAT SERPL-MCNC: 0.89 MG/DL (ref 0.57–1)
DEPRECATED RDW RBC AUTO: 39.7 FL (ref 37–54)
EGFRCR SERPLBLD CKD-EPI 2021: 81.1 ML/MIN/1.73
EOSINOPHIL # BLD AUTO: 0.15 10*3/MM3 (ref 0–0.4)
EOSINOPHIL NFR BLD AUTO: 3.3 % (ref 0.3–6.2)
ERYTHROCYTE [DISTWIDTH] IN BLOOD BY AUTOMATED COUNT: 11.6 % (ref 12.3–15.4)
GLOBULIN UR ELPH-MCNC: 2.6 GM/DL
GLUCOSE SERPL-MCNC: 81 MG/DL (ref 65–99)
HCT VFR BLD AUTO: 36.8 % (ref 34–46.6)
HDLC SERPL-MCNC: 39 MG/DL (ref 40–60)
HGB BLD-MCNC: 12.1 G/DL (ref 12–15.9)
IMM GRANULOCYTES # BLD AUTO: 0.03 10*3/MM3 (ref 0–0.05)
IMM GRANULOCYTES NFR BLD AUTO: 0.7 % (ref 0–0.5)
LDLC SERPL CALC-MCNC: 131 MG/DL (ref 0–100)
LDLC/HDLC SERPL: 3.34 {RATIO}
LYMPHOCYTES # BLD AUTO: 1.97 10*3/MM3 (ref 0.7–3.1)
LYMPHOCYTES NFR BLD AUTO: 42.7 % (ref 19.6–45.3)
MCH RBC QN AUTO: 31.3 PG (ref 26.6–33)
MCHC RBC AUTO-ENTMCNC: 32.9 G/DL (ref 31.5–35.7)
MCV RBC AUTO: 95.3 FL (ref 79–97)
MONOCYTES # BLD AUTO: 0.38 10*3/MM3 (ref 0.1–0.9)
MONOCYTES NFR BLD AUTO: 8.2 % (ref 5–12)
NEUTROPHILS NFR BLD AUTO: 2.02 10*3/MM3 (ref 1.7–7)
NEUTROPHILS NFR BLD AUTO: 43.8 % (ref 42.7–76)
NRBC BLD AUTO-RTO: 0 /100 WBC (ref 0–0.2)
PLATELET # BLD AUTO: 308 10*3/MM3 (ref 140–450)
PMV BLD AUTO: 10.8 FL (ref 6–12)
POTASSIUM SERPL-SCNC: 3.9 MMOL/L (ref 3.5–5.2)
PROT SERPL-MCNC: 6.5 G/DL (ref 6–8.5)
RBC # BLD AUTO: 3.86 10*6/MM3 (ref 3.77–5.28)
SODIUM SERPL-SCNC: 138 MMOL/L (ref 136–145)
T4 FREE SERPL-MCNC: 1.07 NG/DL (ref 0.92–1.68)
TRIGL SERPL-MCNC: 69 MG/DL (ref 0–150)
TSH SERPL DL<=0.05 MIU/L-ACNC: 1.63 UIU/ML (ref 0.27–4.2)
VLDLC SERPL-MCNC: 13 MG/DL (ref 5–40)
WBC NRBC COR # BLD AUTO: 4.61 10*3/MM3 (ref 3.4–10.8)

## 2024-10-27 LAB
ALPHA-GAL IGE QN: 0.32 KU/L
BEEF IGE QN: 0.27 KU/L
CONV CLASS DESCRIPTION: ABNORMAL
IGE SERPL-ACNC: 60 IU/ML (ref 6–495)
LAMB IGE QN: 0.13 KU/L
PORK IGE QN: 0.12 KU/L

## 2024-10-28 ENCOUNTER — TELEPHONE (OUTPATIENT)
Dept: FAMILY MEDICINE CLINIC | Facility: CLINIC | Age: 49
End: 2024-10-28

## 2024-10-28 NOTE — TELEPHONE ENCOUNTER
Spoke with patients  Mio (HIPAA VERIFIED). He is wondering if there can be a something that can test for poultry.

## 2024-10-28 NOTE — TELEPHONE ENCOUNTER
Please let them know that the alpha gal is positive. She should avoid meat that is produced by mammals (Pork, beef, and Lamb) for the next few months. This will hopefully improve in the next few months.

## 2024-10-28 NOTE — TELEPHONE ENCOUNTER
Caller: Ezra Whittington    Relationship: Emergency Contact    Best call back number:      Caller requesting test results: EZRA    What test was performed: LABS    When was the test performed: [id number]     Where was the test performed: [facility]    Additional notes: PATIENT IS CALLING BACK TO GET RESULTS OF THE ALPHA GAL TEST

## 2024-10-29 ENCOUNTER — OFFICE VISIT (OUTPATIENT)
Dept: FAMILY MEDICINE CLINIC | Facility: CLINIC | Age: 49
End: 2024-10-29
Payer: COMMERCIAL

## 2024-10-29 VITALS
HEART RATE: 94 BPM | HEIGHT: 61 IN | WEIGHT: 135.6 LBS | TEMPERATURE: 98.8 F | BODY MASS INDEX: 25.6 KG/M2 | OXYGEN SATURATION: 98 % | DIASTOLIC BLOOD PRESSURE: 64 MMHG | SYSTOLIC BLOOD PRESSURE: 98 MMHG

## 2024-10-29 DIAGNOSIS — T78.40XA ALLERGY, INITIAL ENCOUNTER: Primary | ICD-10-CM

## 2024-10-29 DIAGNOSIS — N94.6 MENSTRUAL CRAMPS: ICD-10-CM

## 2024-10-29 DIAGNOSIS — Z23 NEED FOR IMMUNIZATION AGAINST INFLUENZA: Primary | ICD-10-CM

## 2024-10-29 DIAGNOSIS — Z86.19 HISTORY OF HPV INFECTION: ICD-10-CM

## 2024-10-29 DIAGNOSIS — T78.40XA ALLERGY, INITIAL ENCOUNTER: ICD-10-CM

## 2024-10-29 RX ORDER — BACLOFEN 10 MG/1
10 TABLET ORAL 3 TIMES DAILY
Qty: 30 TABLET | Refills: 0 | Status: SHIPPED | OUTPATIENT
Start: 2024-10-29

## 2024-10-29 NOTE — TELEPHONE ENCOUNTER
I'm sure there is some allergy tests that can be done for chicken. We can discuss it at her visit this afternoon.

## 2024-10-29 NOTE — PROGRESS NOTES
Patient Name: Annalisa Whittington Today's Date: 10/30/2024   Patient MRN / CSN: [id number] / [id number] Date of Encounter: 10/29/2024   Patient Age / : 48 y.o. / 1975 Encounter Provider: ROCKY Solomon   Referring Physician: No ref. provider found          Annalisa is a 48 y.o. female who is being seen today for Results (Pt wants to talk about test results. )      History of Present Illness  She presents today to discuss recent lab results.  She was recently diagnosed with alpha gal.  She would like to discuss further allergy testing to make sure she is not allergic to chicken as well as shellfish.  She is been allergy tested in the past that was allergic to chicken feathers.  She also has a history of an exposed HPV.  She would like to make sure that she is not exposed to HIV as well.  She also states that when she has her menstrual period she is having severe cramps.  She would like to consider medications for this.      Allergies include:Bee venom, Mold extract [trichophyton], Latex, Pollen extract, Pork allergy, Pork-derived products, and Shrimp  Current Outpatient Medications   Medication Sig Dispense Refill    Multiple Vitamins-Minerals (CENTRUM ADULTS PO) Take  by mouth.      baclofen (LIORESAL) 10 MG tablet Take 1 tablet by mouth 3 (Three) Times a Day. 30 tablet 0     No current facility-administered medications for this visit.     Past Medical History:   Diagnosis Date    Actinomycosis     Allergic     Anxiety     Depression     Hypothyroidism 2023    Kidney stone     Mixed hyperlipidemia 2024     Family History   Problem Relation Age of Onset    Breast cancer Maternal Grandmother     Diabetes Father     Hypertension Mother      Past Surgical History:   Procedure Laterality Date    BRONCHOSCOPY      COLONOSCOPY N/A 2024    Procedure: COLONOSCOPY;  Surgeon: Rhina Lynn MD;  [facility];  Service: Gastroenterology;  Laterality: N/A;    COLPOSCOPY      CYSTOSCOPY W/  "LASER LITHOTRIPSY      EXTRACORPOREAL SHOCK WAVE LITHOTRIPSY (ESWL) Right 5/31/2019    Procedure: EXTRACORPOREAL SHOCKWAVE LITHOTRIPSY;  Surgeon: Vivek Tse MD;  Location: [facility];  Service: Urology     Social History     Substance and Sexual Activity   Alcohol Use Yes    Comment: OCASSIONAL     Social History     Tobacco Use   Smoking Status Never   Smokeless Tobacco Former    Types: Snuff     Social History     Substance and Sexual Activity   Drug Use No     Review of Systems   Respiratory:  Negative for chest tightness, shortness of breath and wheezing.    Cardiovascular:  Negative for chest pain and palpitations.   Gastrointestinal:  Negative for abdominal pain.        Depression Assessment Review:  PHQ-9 Total Score:    Vital Signs & Measurements Taken This Encounter  BP 98/64 (BP Location: Right arm, Patient Position: Sitting, Cuff Size: Adult)   Pulse 94   Temp 98.8 °F (37.1 °C) (Oral)   Ht 154.9 cm (60.98\")   Wt 61.5 kg (135 lb 9.6 oz)   SpO2 98%   BMI 25.63 kg/m²    SpO2 Percentage    10/29/24 1658   SpO2: 98%        BMI is >= 25 and <30. (Overweight) The following options were offered after discussion;: exercise counseling/recommendations and nutrition counseling/recommendations      Physical Exam  Constitutional:       Appearance: Normal appearance.   HENT:      Right Ear: External ear normal.      Left Ear: External ear normal.      Nose: Nose normal.      Mouth/Throat:      Mouth: Mucous membranes are moist.   Eyes:      Pupils: Pupils are equal, round, and reactive to light.   Cardiovascular:      Rate and Rhythm: Normal rate and regular rhythm.      Pulses: Normal pulses.   Pulmonary:      Effort: Pulmonary effort is normal.      Breath sounds: Normal breath sounds.   Abdominal:      Palpations: Abdomen is soft.   Skin:     General: Skin is warm.   Neurological:      General: No focal deficit present.      Mental Status: She is alert and oriented to person, place, and time. "   Psychiatric:         Mood and Affect: Mood normal.         Behavior: Behavior normal.          Fall Risk Assessment:  Fall Risk Assessment was completed, and patient is at low risk for falls.    Assessment & Plan  Patient Active Problem List   Diagnosis    Pulmonary nodules    Kidney stones    Hypothyroidism    Encounter for screening for malignant neoplasm of colon    HPV (human papilloma virus) infection    Mixed hyperlipidemia       ICD-10-CM ICD-9-CM   1. Need for immunization against influenza  Z23 V04.81   2. Allergy, initial encounter  T78.40XA 995.3   3. Menstrual cramps  N94.6 625.3   4. History of HPV infection  Z86.19 V12.09     Diagnoses and all orders for this visit:    1. Need for immunization against influenza (Primary)  -     Cancel: Fluzone >6mos ([id number])    2. Allergy, initial encounter  -     Shellfish Panel IgE; Future  -     Chicken IgE  -     Shellfish Panel IgE    3. Menstrual cramps  -     baclofen (LIORESAL) 10 MG tablet; Take 1 tablet by mouth 3 (Three) Times a Day.  Dispense: 30 tablet; Refill: 0    4. History of HPV infection  -     HIV-1/O/2 Ag/Ab w Reflex; Future  -     HIV-1/O/2 Ag/Ab w Reflex       -- She presents today with concerns for allergies.  We will check a shellfish panel IgE as well as a chicken allergy.  I will also start her on baclofen as needed for menstrual cramps.  We also update flu shot today as well.  She also states she has been exposed to HPV in the past and would like to be tested for HIV as well.    --We will not update flu shot today until allergy testing is completed.      Return in about 3 months (around 1/29/2025).           This document has been electronically signed by ROCKY Solomon  October 30, 2024 15:30 EDT    ROCKY Solomon  [address] [place] [address] [phone] (office)    Part of this note may be an electronic transcription/translation of spoken language to printed text using the Dragon Dictation System.  Answers  submitted by the patient for this visit:  Other (Submitted on 10/28/2024)  Please describe your symptoms.: Joint pain  Have you had these symptoms before?: Yes  How long have you been having these symptoms?: Greater than 2 weeks  Please list any medications you are currently taking for this condition.: Tylenol and ibuprofen  Please describe any probable cause for these symptoms. : Alpha gal syndrome  Primary Reason for Visit (Submitted on 10/28/2024)  What is the primary reason for your visit?: Problem Not Listed

## 2024-10-30 ENCOUNTER — CLINICAL SUPPORT (OUTPATIENT)
Dept: FAMILY MEDICINE CLINIC | Facility: CLINIC | Age: 49
End: 2024-10-30
Payer: COMMERCIAL

## 2024-10-30 DIAGNOSIS — Z23 NEED FOR IMMUNIZATION AGAINST INFLUENZA: Primary | ICD-10-CM

## 2024-10-30 PROCEDURE — 86003 ALLG SPEC IGE CRUDE XTRC EA: CPT | Performed by: NURSE PRACTITIONER

## 2024-10-30 PROCEDURE — G0432 EIA HIV-1/HIV-2 SCREEN: HCPCS | Performed by: NURSE PRACTITIONER

## 2024-10-30 PROCEDURE — 36415 COLL VENOUS BLD VENIPUNCTURE: CPT | Performed by: NURSE PRACTITIONER

## 2024-10-30 NOTE — PROGRESS NOTES
Venipuncture Blood Specimen Collection  Venipuncture performed in LEFT ARM by Alia Samuels RN with good hemostasis. Patient tolerated the procedure well without complications.   10/30/24   Alia Samuels RN

## 2024-10-31 LAB — HIV 1+2 AB+HIV1 P24 AG SERPL QL IA: NORMAL
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
## 2024-11-06 ENCOUNTER — TELEPHONE (OUTPATIENT)
Dept: FAMILY MEDICINE CLINIC | Facility: CLINIC | Age: 49
End: 2024-11-06

## 2024-11-06 ENCOUNTER — CLINICAL SUPPORT (OUTPATIENT)
Dept: FAMILY MEDICINE CLINIC | Facility: CLINIC | Age: 49
End: 2024-11-06
Payer: COMMERCIAL
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
## 2024-11-06 PROCEDURE — 87484 EHRLICHA CHAFFEENSIS AMP PRB: CPT | Performed by: NURSE PRACTITIONER

## 2024-11-06 PROCEDURE — 87798 DETECT AGENT NOS DNA AMP: CPT | Performed by: NURSE PRACTITIONER

## 2024-11-06 PROCEDURE — 87468 ANAPLSMA PHGCYTOPHLM AMP PRB: CPT | Performed by: NURSE PRACTITIONER

## 2024-11-06 RX ORDER — TRIAMCINOLONE ACETONIDE 1 MG/G
1 OINTMENT TOPICAL 2 TIMES DAILY
Qty: 30 G | Refills: 0 | Status: SHIPPED | OUTPATIENT
Start: 2024-11-06

## 2024-11-06 NOTE — PROGRESS NOTES
Venipuncture Blood Specimen Collection  Venipuncture performed in LEFT ARM by Alia Samuels RN with good hemostasis. Patient tolerated the procedure well without complications.   11/06/24   Alia Samuels RN

## 2024-11-06 NOTE — TELEPHONE ENCOUNTER
Pt came in for labs and reported acne flare up or rash on her face. Requesting a steroid if needed/appropriate. After she washes her make up off at home she will try and send a picture through [employer]

## 2024-11-07 ENCOUNTER — TELEPHONE (OUTPATIENT)
Dept: FAMILY MEDICINE CLINIC | Facility: CLINIC | Age: 49
End: 2024-11-07

## 2024-11-07 RX ORDER — DOXYCYCLINE 100 MG/1
100 CAPSULE ORAL 2 TIMES DAILY
Qty: 20 CAPSULE | Refills: 0 | Status: SHIPPED | OUTPATIENT
Start: 2024-11-07

## 2024-11-07 NOTE — TELEPHONE ENCOUNTER
Caller: Jeb Whittington    Relationship: Emergency Contact    Best call back number: [phone]     What is the best time to reach you: ANY    Who are you requesting to speak with (clinical staff, provider,  specific staff member): NURSE    Do you know the name of the person who called:     What was the call regarding: PATIENT IS HAVING BREAK OUTS AND WOULD LIKE TO START THE DOXYCYCLINE PROPHYLACTICALLY.  HER FACE IS HURTING, RASH EXCLUSIVELY ON HER FACE.  TESTING CAN TAKE TIME.  THEY WERE GOING TO SEND PICTURE OF HER FACE VIA [employer].      PHARMACY:  [facility]    Is it okay if the provider responds through [employer]: PHONE CALL PLEASE

## 2024-11-08 LAB
CHICKEN MEAT IGE QN: <0.1 KU/L
CLAM IGE QN: <0.1 KU/L
CONV CLASS DESCRIPTION: NORMAL
CRAB IGE QN: <0.1 KU/L
LOBSTER IGE QN: <0.1 KU/L
OYSTER IGE QN: <0.1 KU/L
SCALLOP IGE QN: <0.1 KU/L
SHRIMP IGE QN: <0.1 KU/L

## 2024-11-11 ENCOUNTER — TELEPHONE (OUTPATIENT)
Dept: FAMILY MEDICINE CLINIC | Facility: CLINIC | Age: 49
End: 2024-11-11

## 2024-11-11 DIAGNOSIS — N94.6 MENSTRUAL CRAMPS: ICD-10-CM

## 2024-11-11 RX ORDER — METHOCARBAMOL 500 MG/1
500 TABLET, FILM COATED ORAL 3 TIMES DAILY
Qty: 90 TABLET | Refills: 1 | Status: SHIPPED | OUTPATIENT
Start: 2024-11-11

## 2024-11-11 NOTE — TELEPHONE ENCOUNTER
Caller: Jeb Whittington    Relationship: Emergency Contact    Best call back number: [phone]     What medication are you requesting: METHOCARBAMOL    What are your current symptoms:     How long have you been experiencing symptoms:     Have you had these symptoms before:    [] Yes  [] No    Have you been treated for these symptoms before:   [] Yes  [] No    If a prescription is needed, what is your preferred pharmacy and phone number: [place] [address] [facility] - [phone]  - [phone]     Additional notes:  PATIENT OUT OF MEDICATION, NEW PRESCRIPTION.

## 2024-11-12 NOTE — TELEPHONE ENCOUNTER
Left message for patient to call back.    I will START or STAY ON the medications listed below when I get home from the hospital:    aspirin 81 mg oral tablet, chewable  -- 1 tab(s) by mouth once a day  -- Indication: For blood thinner    simvastatin 20 mg oral tablet  -- 1 tab(s) by mouth once a day (at bedtime)  -- Indication: For cholesterol    famotidine 20 mg oral tablet  -- 2 tab(s) by mouth once a day (at bedtime)  -- Indication: For stomach protection    lactobacillus acidophilus oral capsule  -- 1 cap(s) by mouth once a day   -- Indication: For Probiotic    PriLOSEC 40 mg oral delayed release capsule  -- 1 cap(s) by mouth once a day  -- Indication: For stomach protection    sulfamethoxazole-trimethoprim 800 mg-160 mg oral tablet  -- 1 tab(s) by mouth 2 times a day thru 5/27  -- Indication: For UTI (urinary tract infection)

## 2024-11-13 LAB
A PHAGOCYTOPH DNA BLD QL NAA+PROBE: NEGATIVE
EHRLICHIA DNA SPEC QL NAA+PROBE: NEGATIVE
RICKETTSIA RICKETTSII DNA, RT: NOT DETECTED

## 2024-11-13 NOTE — TELEPHONE ENCOUNTER
Called pt to let her know her medication has been called into the pharmacy. PT states she has picked it up.

## 2024-11-14 ENCOUNTER — TELEPHONE (OUTPATIENT)
Dept: FAMILY MEDICINE CLINIC | Facility: CLINIC | Age: 49
End: 2024-11-14

## 2024-11-14 NOTE — TELEPHONE ENCOUNTER
Caller: NelsyJeb    Relationship: Emergency Contact    Best call back number: [phone]    What is the medical concern/diagnosis:     NODULE ON CERVIX    What specialty or service is being requested:     GYNECOLOGY    Any additional details:     PATIENT HAS BEEN HAVING PAIN, MALAISE, NAUSEA VOMITING

## 2024-11-15 DIAGNOSIS — N88.9 CERVIX ABNORMALITY: Primary | ICD-10-CM

## 2024-12-04 ENCOUNTER — OFFICE VISIT (OUTPATIENT)
Dept: FAMILY MEDICINE CLINIC | Facility: CLINIC | Age: 49
End: 2024-12-04
Payer: COMMERCIAL

## 2024-12-04 VITALS
SYSTOLIC BLOOD PRESSURE: 104 MMHG | HEART RATE: 86 BPM | TEMPERATURE: 98.9 F | WEIGHT: 137 LBS | DIASTOLIC BLOOD PRESSURE: 92 MMHG | OXYGEN SATURATION: 98 % | HEIGHT: 61 IN | BODY MASS INDEX: 25.86 KG/M2

## 2024-12-04 DIAGNOSIS — N94.6 DYSMENORRHEA: Primary | ICD-10-CM

## 2024-12-04 DIAGNOSIS — J06.9 ACUTE URI: ICD-10-CM

## 2024-12-04 DIAGNOSIS — L70.9 ACNE, UNSPECIFIED ACNE TYPE: ICD-10-CM

## 2024-12-04 PROCEDURE — 99214 OFFICE O/P EST MOD 30 MIN: CPT | Performed by: NURSE PRACTITIONER

## 2024-12-04 PROCEDURE — 1126F AMNT PAIN NOTED NONE PRSNT: CPT | Performed by: NURSE PRACTITIONER

## 2024-12-04 RX ORDER — AZITHROMYCIN 250 MG/1
TABLET, FILM COATED ORAL
Qty: 6 TABLET | Refills: 0 | Status: SHIPPED | OUTPATIENT
Start: 2024-12-04

## 2024-12-04 NOTE — PROGRESS NOTES
Patient Name: Annalisa Whittington Today's Date: 2024   Patient MRN / CSN: [id number] / [id number] Date of Encounter: 2024   Patient Age / : 49 y.o. / 1975 Encounter Provider: ROCKY Solomon   Referring Physician: No ref. provider found          Annalisa is a 49 y.o. female who is being seen today for facial breackouts (Pt states that she keeps having facial breakouts that are leaving scars. ), Nasal Congestion (Sneezing and runny nose with sinus drainage. ), and Cough (Coughing and coughing up yellow mucus. With a sore throat. Pt states that she payton had these symptoms for a few days. )      History of Present Illness  She presents today with complaints of acne type bumps on her face.  She says this has been happening intermittently for the last few months.  She thinks it may be related to her hormones so she would like for that to be evaluated today.    She states she has also had cough and congestion for the last 5 days.  She is productive of sputum.  She has had no fever.      Allergies include:Bee venom, Mold extract [trichophyton], Latex, Pollen extract, Pork allergy, Pork-derived products, and Shrimp  Current Outpatient Medications   Medication Sig Dispense Refill    methocarbamol (ROBAXIN) 500 MG tablet Take 1 tablet by mouth 3 (Three) Times a Day. 90 tablet 1    triamcinolone (KENALOG) 0.1 % ointment Apply 1 Application topically to the appropriate area as directed 2 (Two) Times a Day. 30 g 0    azithromycin (Zithromax Z-Irving) 250 MG tablet Take 2 tablets by mouth on day 1, then 1 tablet daily on days 2-5 6 tablet 0    betamethasone valerate (VALISONE) 0.1 % ointment Apply 1 Application topically to the appropriate area as directed 2 (Two) Times a Day. 15 g 0    doxycycline (VIBRAMYCIN) 100 MG capsule Take 1 capsule by mouth 2 (Two) Times a Day. (Patient not taking: Reported on 2024) 20 capsule 0    Multiple Vitamins-Minerals (CENTRUM ADULTS PO) Take  by mouth. (Patient not taking:  "Reported on 12/4/2024)       No current facility-administered medications for this visit.     Past Medical History:   Diagnosis Date    Actinomycosis     Allergic     Anxiety     Depression     Hypothyroidism 7/20/2023    Kidney stone     Mixed hyperlipidemia 2/13/2024     Family History   Problem Relation Age of Onset    Breast cancer Maternal Grandmother     Diabetes Father     Hypertension Mother      Past Surgical History:   Procedure Laterality Date    BRONCHOSCOPY      COLONOSCOPY N/A 2/22/2024    Procedure: COLONOSCOPY;  Surgeon: Rhina Lynn MD;  Location: [facility];  Service: Gastroenterology;  Laterality: N/A;    COLPOSCOPY      CYSTOSCOPY W/ LASER LITHOTRIPSY      EXTRACORPOREAL SHOCK WAVE LITHOTRIPSY (ESWL) Right 5/31/2019    Procedure: EXTRACORPOREAL SHOCKWAVE LITHOTRIPSY;  Surgeon: Vivek Tse MD;  Location: [facility];  Service: Urology     Social History     Substance and Sexual Activity   Alcohol Use Yes    Comment: OCASSIONAL     Social History     Tobacco Use   Smoking Status Never   Smokeless Tobacco Former    Types: Snuff     Social History     Substance and Sexual Activity   Drug Use No     Review of Systems   HENT:  Positive for congestion.    Respiratory:  Positive for cough.    Skin:  Positive for color change.        Depression Assessment Review:  PHQ-9 Total Score:    Vital Signs & Measurements Taken This Encounter  /92 (BP Location: Left arm, Patient Position: Sitting, Cuff Size: Adult)   Pulse 86   Temp 98.9 °F (37.2 °C) (Oral)   Ht 154.9 cm (60.98\")   Wt 62.1 kg (137 lb)   SpO2 98%   BMI 25.90 kg/m²    SpO2 Percentage    12/04/24 1632   SpO2: 98%               Physical Exam  Constitutional:       Appearance: Normal appearance.   HENT:      Right Ear: External ear normal.      Nose: Nose normal.      Mouth/Throat:      Mouth: Mucous membranes are moist.   Eyes:      Pupils: Pupils are equal, round, and reactive to light.   Cardiovascular:      Rate " and Rhythm: Normal rate and regular rhythm.      Pulses: Normal pulses.   Pulmonary:      Effort: Pulmonary effort is normal.   Abdominal:      Palpations: Abdomen is soft.   Musculoskeletal:         General: Normal range of motion.   Skin:     Findings: Acne present.      Comments: Worsening acne around face.   Neurological:      General: No focal deficit present.      Mental Status: She is alert.   Psychiatric:         Mood and Affect: Mood normal.          Fall Risk Assessment:  Fall Risk Assessment was completed, and patient is at low risk for falls.    Assessment & Plan  Patient Active Problem List   Diagnosis    Pulmonary nodules    Kidney stones    Hypothyroidism    Encounter for screening for malignant neoplasm of colon    HPV (human papilloma virus) infection    Mixed hyperlipidemia       ICD-10-CM ICD-9-CM   1. Dysmenorrhea  N94.6 625.3   2. Acute URI  J06.9 465.9   3. Acne, unspecified acne type  L70.9 706.1     Diagnoses and all orders for this visit:    1. Dysmenorrhea (Primary)  -     Estrogens, Total; Future  -     Testosterone (Free & Total), LC / MS; Future  -     Doroteo+17 OHP+Andros; Future  -     Prolactin; Future  -     TSH; Future  -     FSH & LH; Future    2. Acute URI  -     azithromycin (Zithromax Z-Irving) 250 MG tablet; Take 2 tablets by mouth on day 1, then 1 tablet daily on days 2-5  Dispense: 6 tablet; Refill: 0    3. Acne, unspecified acne type  -     betamethasone valerate (VALISONE) 0.1 % ointment; Apply 1 Application topically to the appropriate area as directed 2 (Two) Times a Day.  Dispense: 15 g; Refill: 0       -- I will start her on betamethasone for her acute facial breakouts.  I will also order hormone levels as listed above for dysmenorrhea.    --She is also having cough and congestion symptoms for the last 6 days.  I will start her on azithromycin for this acute URI.  COVID/flu testing declined.      Follow-up in 1 month.           This document has been electronically signed by  Los Garrett, APRVIKI  December 6, 2024 10:02 EST    Los Garrett, APRVIKI  [address] [place] [address]  [phone] (office)    Part of this note may be an electronic transcription/translation of spoken language to printed text using the Dragon Dictation System.

## 2025-01-17 RX ORDER — HYDROXYZINE HYDROCHLORIDE 25 MG/1
25 TABLET, FILM COATED ORAL 3 TIMES DAILY PRN
Qty: 90 TABLET | Refills: 1 | Status: SHIPPED | OUTPATIENT
Start: 2025-01-17

## 2025-03-07 ENCOUNTER — LAB (OUTPATIENT)
Dept: LAB | Facility: HOSPITAL | Age: 50
End: 2025-03-07
Payer: COMMERCIAL

## 2025-03-07 DIAGNOSIS — Z11.1 ENCOUNTER FOR TB TINE TEST: ICD-10-CM

## 2025-03-07 DIAGNOSIS — Z11.1 ENCOUNTER FOR TB TINE TEST: Primary | ICD-10-CM

## 2025-03-07 PROCEDURE — 86480 TB TEST CELL IMMUN MEASURE: CPT

## 2025-03-07 PROCEDURE — 36415 COLL VENOUS BLD VENIPUNCTURE: CPT

## 2025-03-10 RX ORDER — HYDROXYZINE HYDROCHLORIDE 25 MG/1
25 TABLET, FILM COATED ORAL 3 TIMES DAILY PRN
Qty: 90 TABLET | Refills: 0 | Status: SHIPPED | OUTPATIENT
Start: 2025-03-10 | End: 2025-03-12 | Stop reason: SDUPTHER

## 2025-03-11 LAB
GAMMA INTERFERON BACKGROUND BLD IA-ACNC: 0.01 IU/ML
M TB IFN-G BLD-IMP: NEGATIVE
M TB IFN-G CD4+ BCKGRND COR BLD-ACNC: 0.01 IU/ML
M TB IFN-G CD4+CD8+ BCKGRND COR BLD-ACNC: 0.01 IU/ML
MITOGEN IGNF BCKGRD COR BLD-ACNC: 5.12 IU/ML
QUANTIFERON INCUBATION: NORMAL
SERVICE CMNT-IMP: NORMAL

## 2025-03-14 ENCOUNTER — TELEPHONE (OUTPATIENT)
Dept: FAMILY MEDICINE CLINIC | Facility: CLINIC | Age: 50
End: 2025-03-14

## 2025-03-14 RX ORDER — HYDROXYZINE HYDROCHLORIDE 25 MG/1
25 TABLET, FILM COATED ORAL 3 TIMES DAILY PRN
Qty: 90 TABLET | Refills: 0 | Status: SHIPPED | OUTPATIENT
Start: 2025-03-14

## 2025-03-14 NOTE — TELEPHONE ENCOUNTER
Caller: Mio Whittington    Relationship: Self    Best call back number: [phone]     Caller requesting test results: MIO    What test was performed: TB TEST    When was the test performed: ??      Additional notes: WOULD LIKE A COPY OF THE RESULTS MAILED      [address] [facility] [address]  [facility] [address]

## 2025-03-18 DIAGNOSIS — E03.9 HYPOTHYROIDISM, UNSPECIFIED TYPE: Primary | ICD-10-CM

## 2025-03-18 DIAGNOSIS — E78.2 MIXED HYPERLIPIDEMIA: ICD-10-CM

## 2025-03-21 ENCOUNTER — OFFICE VISIT (OUTPATIENT)
Dept: FAMILY MEDICINE CLINIC | Facility: CLINIC | Age: 50
End: 2025-03-21
Payer: COMMERCIAL

## 2025-03-21 VITALS
SYSTOLIC BLOOD PRESSURE: 116 MMHG | BODY MASS INDEX: 23.6 KG/M2 | OXYGEN SATURATION: 98 % | HEART RATE: 77 BPM | TEMPERATURE: 98.2 F | DIASTOLIC BLOOD PRESSURE: 82 MMHG | WEIGHT: 125 LBS | HEIGHT: 61 IN
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
## 2025-03-21 DIAGNOSIS — E78.2 MIXED HYPERLIPIDEMIA: ICD-10-CM

## 2025-03-21 DIAGNOSIS — E03.9 HYPOTHYROIDISM, UNSPECIFIED TYPE: ICD-10-CM

## 2025-03-21 DIAGNOSIS — R79.89 ELEVATED BLOOD KETONE BODY LEVEL: Primary | ICD-10-CM

## 2025-03-21 DIAGNOSIS — R94.31 ABNORMAL EKG: ICD-10-CM

## 2025-03-21 LAB
BILIRUB BLD-MCNC: NEGATIVE MG/DL
CLARITY, POC: CLEAR
COLOR UR: YELLOW
GLUCOSE UR STRIP-MCNC: NEGATIVE MG/DL
KETONES UR QL: NEGATIVE
LEUKOCYTE EST, POC: NEGATIVE
NITRITE UR-MCNC: NEGATIVE MG/ML
PH UR: 6 [PH] (ref 5–8)
PROT UR STRIP-MCNC: NEGATIVE MG/DL
RBC # UR STRIP: ABNORMAL /UL
SP GR UR: 1.01 (ref 1–1.03)
UROBILINOGEN UR QL: ABNORMAL

## 2025-03-21 PROCEDURE — 84439 ASSAY OF FREE THYROXINE: CPT | Performed by: NURSE PRACTITIONER

## 2025-03-21 PROCEDURE — 84443 ASSAY THYROID STIM HORMONE: CPT | Performed by: NURSE PRACTITIONER

## 2025-03-21 PROCEDURE — 80061 LIPID PANEL: CPT | Performed by: NURSE PRACTITIONER

## 2025-03-21 RX ORDER — OXYCODONE HYDROCHLORIDE 5 MG/1
5 TABLET ORAL
COMMUNITY
Start: 2025-03-20 | End: 2025-03-25

## 2025-03-21 RX ORDER — EPINEPHRINE 0.3 MG/.3ML
0.3 INJECTION SUBCUTANEOUS
COMMUNITY

## 2025-03-21 RX ORDER — DOXYCYCLINE 100 MG/1
100 CAPSULE ORAL 2 TIMES DAILY
Qty: 20 CAPSULE | Refills: 0 | Status: SHIPPED | OUTPATIENT
Start: 2025-03-21

## 2025-03-21 NOTE — PROGRESS NOTES
Venipuncture Blood Specimen Collection  Venipuncture performed in right antecubital by Kirill Sams with good hemostasis. Patient tolerated the procedure well without complications.   03/21/25   Kirill Sams

## 2025-03-21 NOTE — PROGRESS NOTES
Patient Name: Annalisa Whittington Today's Date: 3/21/2025   Patient MRN / CSN: [id number] / [id number] Date of Encounter: 3/21/2025   Patient Age / : 49 y.o. / 1975 Encounter Provider: ROCKY Solomon   Referring Physician: No ref. provider found          Annalisa is a 49 y.o. female who is being seen today for Labs Only (Pt is requesting labs. ), Urinary Tract Infection (Uti symptoms. ), Abnormal ECG (Pt has gotten an abnormal EKG wants to have another. ), and Tick Removal (Pt has been bite by a tick a couple of times last week. )      History of Present Illness  She recently had a women's health procedure and they did a previsit EKG that showed an infarct.  She is concerned with these results would like for the EKG repeated because she is concerned for artifact.    She had previous lab orders that she would like to get today to check her lipid panel as well as her thyroid hormones.  Urinary Tract Infection   This is a new problem. The current episode started yesterday. The pain is mild. There has been no fever. Associated symptoms include hematuria. Pertinent negatives include no chills. She has tried nothing for the symptoms. The treatment provided no relief.   Tick Removal  Symptoms are new.   Onset was in the past 7 days.   Pertinent negative symptoms include no chills and no rash.   Aggravating factors include nothing.   Treatments tried include nothing.   Improvement on treatment was no relief.       Allergies include:Bee venom, Mold extract [trichophyton], Latex, Pollen extract, Pork allergy, Pork-derived products, and Shrimp  Current Outpatient Medications   Medication Sig Dispense Refill    EPINEPHrine (EPIPEN) 0.3 MG/0.3ML solution auto-injector injection Inject 0.3 mL into the appropriate muscle as directed by prescriber.      oxyCODONE (ROXICODONE) 5 MG immediate release tablet Take 1 tablet by mouth.      azithromycin (Zithromax Z-Irving) 250 MG tablet Take 2 tablets by mouth on day 1, then 1 tablet  daily on days 2-5 (Patient not taking: Reported on 3/21/2025) 6 tablet 0    betamethasone valerate (VALISONE) 0.1 % ointment Apply 1 Application topically to the appropriate area as directed 2 (Two) Times a Day. (Patient not taking: Reported on 3/21/2025) 15 g 0    doxycycline (VIBRAMYCIN) 100 MG capsule Take 1 capsule by mouth 2 (Two) Times a Day. 20 capsule 0    hydrOXYzine (ATARAX) 25 MG tablet Take 1 tablet by mouth 3 (Three) Times a Day As Needed for Itching. (Patient not taking: Reported on 3/21/2025) 90 tablet 0    methocarbamol (ROBAXIN) 500 MG tablet Take 1 tablet by mouth 3 (Three) Times a Day. (Patient not taking: Reported on 3/21/2025) 90 tablet 1    Multiple Vitamins-Minerals (CENTRUM ADULTS PO) Take  by mouth. (Patient not taking: Reported on 3/21/2025)      triamcinolone (KENALOG) 0.1 % ointment Apply 1 Application topically to the appropriate area as directed 2 (Two) Times a Day. (Patient not taking: Reported on 3/21/2025) 30 g 0     No current facility-administered medications for this visit.     Past Medical History:   Diagnosis Date    Actinomycosis     Allergic     Anxiety     Depression     Hypothyroidism 7/20/2023    Kidney stone     Mixed hyperlipidemia 2/13/2024     Family History   Problem Relation Age of Onset    Breast cancer Maternal Grandmother     Diabetes Father     Hypertension Mother      Past Surgical History:   Procedure Laterality Date    BRONCHOSCOPY      COLONOSCOPY N/A 2/22/2024    Procedure: COLONOSCOPY;  Surgeon: Rhnia Lynn MD;  Location: [facility] OR;  Service: Gastroenterology;  Laterality: N/A;    COLPOSCOPY      CYSTOSCOPY W/ LASER LITHOTRIPSY      EXTRACORPOREAL SHOCK WAVE LITHOTRIPSY (ESWL) Right 5/31/2019    Procedure: EXTRACORPOREAL SHOCKWAVE LITHOTRIPSY;  Surgeon: Vivek Tse MD;  Location: [facility] OR;  Service: Urology     Social History     Substance and Sexual Activity   Alcohol Use Yes    Comment: OCASSIONAL     Social History     Tobacco  "Use   Smoking Status Never   Smokeless Tobacco Former    Types: Snuff     Social History     Substance and Sexual Activity   Drug Use No     Review of Systems   Constitutional:  Negative for chills.   Genitourinary:  Positive for hematuria.   Skin:  Negative for color change, rash and wound.        Depression Assessment Review:  PHQ-9 Total Score:    Vital Signs & Measurements Taken This Encounter  /82 (BP Location: Left arm, Patient Position: Sitting, Cuff Size: Adult)   Pulse 77   Temp 98.2 °F (36.8 °C) (Oral)   Ht 154.9 cm (60.98\")   Wt 56.7 kg (125 lb)   SpO2 98%   BMI 23.63 kg/m²    SpO2 Percentage    03/21/25 1456   SpO2: 98%        BMI is within normal parameters. No other follow-up for BMI required.      Physical Exam  Constitutional:       Appearance: Normal appearance.   HENT:      Right Ear: External ear normal.      Left Ear: External ear normal.      Nose: Nose normal.      Mouth/Throat:      Mouth: Mucous membranes are moist.   Eyes:      Pupils: Pupils are equal, round, and reactive to light.   Cardiovascular:      Rate and Rhythm: Normal rate and regular rhythm.      Pulses: Normal pulses.   Pulmonary:      Effort: Pulmonary effort is normal.   Abdominal:      Palpations: Abdomen is soft.   Musculoskeletal:         General: Normal range of motion.   Skin:     General: Skin is warm.   Neurological:      General: No focal deficit present.      Mental Status: She is alert and oriented to person, place, and time.   Psychiatric:         Mood and Affect: Mood normal.         Behavior: Behavior normal.          Fall Risk Assessment:  Fall Risk Assessment was completed, and patient is at low risk for falls.    Assessment & Plan  Patient Active Problem List   Diagnosis    Pulmonary nodules    Kidney stones    Hypothyroidism    Encounter for screening for malignant neoplasm of colon    HPV (human papilloma virus) infection    Mixed hyperlipidemia       ICD-10-CM ICD-9-CM   1. Elevated blood ketone " body level  R79.89 790.99   2. Abnormal EKG  R94.31 794.31   3. Tick bite of multiple sites  W57.XXXA 919.4     E906.4   4. Hypothyroidism, unspecified type  E03.9 244.9   5. Mixed hyperlipidemia  E78.2 272.2     Diagnoses and all orders for this visit:    1. Elevated blood ketone body level (Primary)  -     POC Urinalysis Dipstick    2. Abnormal EKG  -     ECG 12 Lead    3. Tick bite of multiple sites  -     doxycycline (VIBRAMYCIN) 100 MG capsule; Take 1 capsule by mouth 2 (Two) Times a Day.  Dispense: 20 capsule; Refill: 0    4. Hypothyroidism, unspecified type  -     T4, Free  -     TSH    5. Mixed hyperlipidemia  -     Lipid Panel     -- Her urinalysis is unremarkable in office today.  EKG shows normal sinus rhythm.  We will update lab work as listed above.  She also states she has pulled off multiple ticks this spring so I will start her on doxycycline.        Follow-up in 3 months.           This document has been electronically signed by ROCKY Solomon  March 21, 2025 16:53 EDT    ROCKY Solomon  [address] [place] [address]  [phone] (office)    Part of this note may be an electronic transcription/translation of spoken language to printed text using the Dragon Dictation System.

## 2025-03-22 LAB
CHOLEST SERPL-MCNC: 157 MG/DL (ref 0–200)
HDLC SERPL-MCNC: 43 MG/DL (ref 40–60)
LDLC SERPL CALC-MCNC: 102 MG/DL (ref 0–100)
LDLC/HDLC SERPL: 2.38 {RATIO}
T4 FREE SERPL-MCNC: 1.28 NG/DL (ref 0.92–1.68)
TRIGL SERPL-MCNC: 58 MG/DL (ref 0–150)
TSH SERPL DL<=0.05 MIU/L-ACNC: 2.48 UIU/ML (ref 0.27–4.2)
VLDLC SERPL-MCNC: 12 MG/DL (ref 5–40)

## 2025-03-24 ENCOUNTER — TELEPHONE (OUTPATIENT)
Dept: FAMILY MEDICINE CLINIC | Facility: CLINIC | Age: 50
End: 2025-03-24

## 2025-03-24 RX ORDER — METHYLPREDNISOLONE 4 MG/1
TABLET ORAL
Qty: 21 TABLET | Refills: 0 | Status: SHIPPED | OUTPATIENT
Start: 2025-03-24

## 2025-03-24 NOTE — TELEPHONE ENCOUNTER
Caller: Jeb Whittington    Relationship: Emergency Contact    Best call back number: [phone]    What medication are you requesting:     SOMETHING FOR SYMPTOMS    What are your current symptoms:     GREEN MUCUS, COUGH, SORE THROAT, STUFFY NOSE, HEADACHE    How long have you been experiencing symptoms:     SINCE 3/20    If a prescription is needed, what is your preferred pharmacy and phone number: [place] [address] [place] [address] - [phone] [facility] [phone]     CURRENTLY TAKING DOXYCYLIN FOR TICK BITE

## 2025-03-24 NOTE — TELEPHONE ENCOUNTER
The doxycycline should be good for that but ill send in some steroids to help with the inflammation.

## 2025-03-26 ENCOUNTER — TELEPHONE (OUTPATIENT)
Dept: FAMILY MEDICINE CLINIC | Facility: CLINIC | Age: 50
End: 2025-03-26
Payer: COMMERCIAL

## 2025-03-26 NOTE — TELEPHONE ENCOUNTER
Based upon her age and lack of comorbidities listed on problem list, she does not qualify for Paxlovid.  It appears that Los put her on doxycycline for multiple tick bites.  I would recommend she finish it due to that.  In regards to the COVID-19 infection, I recommend she quarantine for 5 days from symptom onset, hydrate well, practice deep breathing exercises, and ambulate every hour while awake.  Encouraged her to reach out if symptoms were to worsen or not improve.

## 2025-04-25 RX ORDER — HYDROXYZINE HYDROCHLORIDE 25 MG/1
25 TABLET, FILM COATED ORAL 3 TIMES DAILY PRN
Qty: 90 TABLET | Refills: 0 | Status: SHIPPED | OUTPATIENT
Start: 2025-04-25

## 2025-06-17 ENCOUNTER — TELEPHONE (OUTPATIENT)
Dept: FAMILY MEDICINE CLINIC | Facility: CLINIC | Age: 50
End: 2025-06-17
Payer: COMMERCIAL

## 2025-06-17 DIAGNOSIS — N39.0 URINARY TRACT INFECTION WITHOUT HEMATURIA, SITE UNSPECIFIED: Primary | ICD-10-CM

## 2025-06-17 RX ORDER — NITROFURANTOIN 25; 75 MG/1; MG/1
100 CAPSULE ORAL 2 TIMES DAILY
Qty: 14 CAPSULE | Refills: 0 | Status: SHIPPED | OUTPATIENT
Start: 2025-06-17

## 2025-06-17 RX ORDER — TRIAMCINOLONE ACETONIDE 1 MG/G
1 OINTMENT TOPICAL 2 TIMES DAILY
Qty: 30 G | Refills: 0 | Status: SHIPPED | OUTPATIENT
Start: 2025-06-17

## 2025-06-17 NOTE — TELEPHONE ENCOUNTER
Caller: Jeb Whittingotn    Relationship: Emergency Contact    Best call back number: [phone]     What medication are you requesting: ANTIBIOTICS     What are your current symptoms: DARK AND CLOUDY URINE, BURNING DURING URINATION    How long have you been experiencing symptoms: 3 DAYS    Have you had these symptoms before:    [x] Yes  [] No    Have you been treated for these symptoms before:   [x] Yes  [] No    If a prescription is needed, what is your preferred pharmacy and phone number: [address] - [phone] [facility] [phone]      Additional notes: PATIENT TOOK THE DOXYCYCLINE LAST TIME AND IT WORKED OK. SHE STATES THE MEDICATION THAT WAS SENT THE TIME BEFORE WORKED BETTER

## 2025-06-17 NOTE — TELEPHONE ENCOUNTER
Pt states that she is in [facility] she is also having UTI symptoms and wanting to know if she could get something sent in for the uti symptoms as well.

## 2025-06-17 NOTE — TELEPHONE ENCOUNTER
Caller: Jeb Whittington    Relationship: Emergency Contact    Best call back number: [phone]     Requested Prescriptions:   Requested Prescriptions     Pending Prescriptions Disp Refills    triamcinolone (KENALOG) 0.1 % ointment 30 g 0     Sig: Apply 1 Application topically to the appropriate area as directed 2 (Two) Times a Day.        Pharmacy where request should be sent: [place] [address] - [phone] [facility] [phone]      Last office visit with prescribing clinician: 3/21/2025   Last telemedicine visit with prescribing clinician: Visit date not found   Next office visit with prescribing clinician: Visit date not found     Additional details provided by patient: PATIENT STARTING HAVING AN ALPHA GAL FLARE UP DUE TO CROSS CONTAINMENT. PATIENT IS OUT    Does the patient have less than a 3 day supply:  [x] Yes  [] No    Would you like a call back once the refill request has been completed: [] Yes [x] No    If the office needs to give you a call back, can they leave a voicemail: [] Yes [x] No    Lore Rincon Rep   06/17/25 14:08 EDT

## 2025-07-28 RX ORDER — HYDROXYZINE HYDROCHLORIDE 25 MG/1
25 TABLET, FILM COATED ORAL 3 TIMES DAILY PRN
Qty: 90 TABLET | Refills: 0 | Status: SHIPPED | OUTPATIENT
Start: 2025-07-28

## 2025-07-29 ENCOUNTER — OFFICE VISIT (OUTPATIENT)
Dept: FAMILY MEDICINE CLINIC | Facility: CLINIC | Age: 50
End: 2025-07-29
Payer: COMMERCIAL

## 2025-07-29 VITALS
SYSTOLIC BLOOD PRESSURE: 108 MMHG | DIASTOLIC BLOOD PRESSURE: 60 MMHG | WEIGHT: 115.8 LBS | OXYGEN SATURATION: 99 % | HEART RATE: 61 BPM | TEMPERATURE: 97 F | HEIGHT: 61 IN | BODY MASS INDEX: 21.86 KG/M2

## 2025-07-29 DIAGNOSIS — Z00.00 ANNUAL PHYSICAL EXAM: Primary | ICD-10-CM

## 2025-07-29 DIAGNOSIS — G89.29 CHRONIC RIGHT-SIDED THORACIC BACK PAIN: ICD-10-CM

## 2025-07-29 DIAGNOSIS — E03.9 ACQUIRED HYPOTHYROIDISM: ICD-10-CM

## 2025-07-29 DIAGNOSIS — Z91.018 ALLERGY TO ALPHA-GAL: ICD-10-CM

## 2025-07-29 DIAGNOSIS — M54.6 CHRONIC RIGHT-SIDED THORACIC BACK PAIN: ICD-10-CM

## 2025-07-29 PROCEDURE — 86008 ALLG SPEC IGE RECOMB EA: CPT | Performed by: NURSE PRACTITIONER

## 2025-07-29 PROCEDURE — 80061 LIPID PANEL: CPT | Performed by: NURSE PRACTITIONER

## 2025-07-29 PROCEDURE — 86003 ALLG SPEC IGE CRUDE XTRC EA: CPT | Performed by: NURSE PRACTITIONER

## 2025-07-29 PROCEDURE — 80050 GENERAL HEALTH PANEL: CPT | Performed by: NURSE PRACTITIONER

## 2025-07-29 PROCEDURE — 82785 ASSAY OF IGE: CPT | Performed by: NURSE PRACTITIONER

## 2025-07-29 PROCEDURE — 84439 ASSAY OF FREE THYROXINE: CPT | Performed by: NURSE PRACTITIONER

## 2025-07-29 RX ORDER — EPINEPHRINE 0.3 MG/.3ML
0.3 INJECTION SUBCUTANEOUS ONCE
Qty: 1 EACH | Refills: 0 | Status: SHIPPED | OUTPATIENT
Start: 2025-07-29 | End: 2025-07-29

## 2025-07-29 NOTE — PROGRESS NOTES
Venipuncture Blood Specimen Collection  Venipuncture performed in left arm by Muriel Olson MA with good hemostasis. Patient tolerated the procedure well without complications.   07/29/25   Muriel Olson MA

## 2025-07-29 NOTE — PROGRESS NOTES
Patient Name: Annalisa Whittington Today's Date: 2025   Patient MRN / CSN: [id number] / [id number] Date of Encounter: 2025   Patient Age / : 49 y.o. / 1975 Encounter Provider: ROCKY Solomon   Referring Physician: No ref. provider found          Annalisa is a 49 y.o. female who is being seen today for Annual Exam, Tick Removal (She is concerned about her alpha gal levels.), Hypothyroidism (She is concerned about her tsh levels.), and referral (She would like a referral for some chronic back pain.  She would also like a referral for a pap smear.)      History of Present Illness  She reports that she has been having thoracic back pain.  She states she has not had any imaging performed but has discomfort most days.  Tick Removal  Today's concern (from pt questionnaire):  She was bitten by a tick and previously diagnosed with Alpha Gal. She would like to have blood work performed to check on her alpha gal numbers.   Symptoms: myalgias    Symptoms: no chest pain    Hypothyroidism  Presents for follow-up visit. Patient reports no palpitations. The symptoms have been stable.       Allergies include:Bee venom, Mold extract [trichophyton], Latex, Pollen extract, Pork allergy, Pork-derived products, and Shrimp  Current Outpatient Medications   Medication Sig Dispense Refill    methocarbamol (ROBAXIN) 500 MG tablet Take 1 tablet by mouth 3 (Three) Times a Day. 90 tablet 1    betamethasone valerate (VALISONE) 0.1 % ointment Apply 1 Application topically to the appropriate area as directed 2 (Two) Times a Day. (Patient not taking: Reported on 2025) 15 g 0    hydrOXYzine (ATARAX) 25 MG tablet TAKE 1 TABLET BY MOUTH THREE TIMES DAILY AS NEEDED FOR ITCHING (Patient not taking: Reported on 2025) 90 tablet 0    Multiple Vitamins-Minerals (CENTRUM ADULTS PO) Take  by mouth. (Patient not taking: Reported on 2024)      nitrofurantoin, macrocrystal-monohydrate, (Macrobid) 100 MG capsule Take 1 capsule by  "mouth 2 (Two) Times a Day. (Patient not taking: Reported on 7/29/2025) 14 capsule 0    triamcinolone (KENALOG) 0.1 % ointment Apply 1 Application topically to the appropriate area as directed 2 (Two) Times a Day. (Patient not taking: Reported on 7/29/2025) 30 g 0     No current facility-administered medications for this visit.     Past Medical History:   Diagnosis Date    Actinomycosis     Allergic     Anxiety     Depression     Hypothyroidism 7/20/2023    Kidney stone     Mixed hyperlipidemia 2/13/2024     Family History   Problem Relation Age of Onset    Breast cancer Maternal Grandmother     Diabetes Father     Hypertension Mother      Past Surgical History:   Procedure Laterality Date    BRONCHOSCOPY      COLONOSCOPY N/A 2/22/2024    Procedure: COLONOSCOPY;  Surgeon: Rhina Lynn MD;  Location: [facility];  Service: Gastroenterology;  Laterality: N/A;    COLPOSCOPY      CYSTOSCOPY W/ LASER LITHOTRIPSY      EXTRACORPOREAL SHOCK WAVE LITHOTRIPSY (ESWL) Right 5/31/2019    Procedure: EXTRACORPOREAL SHOCKWAVE LITHOTRIPSY;  Surgeon: Vivek Tse MD;  Location: [facility];  Service: Urology     Social History     Substance and Sexual Activity   Alcohol Use Yes    Comment: OCASSIONAL     Social History     Tobacco Use   Smoking Status Never   Smokeless Tobacco Former    Types: Snuff     Social History     Substance and Sexual Activity   Drug Use No     Review of Systems   Respiratory:  Negative for chest tightness and shortness of breath.    Cardiovascular:  Negative for chest pain and palpitations.   Musculoskeletal:  Positive for arthralgias, back pain and myalgias.        Depression Assessment Review:  PHQ-9 Total Score:    Vital Signs & Measurements Taken This Encounter  /60 (BP Location: Left arm, Patient Position: Sitting, Cuff Size: Adult)   Pulse 61   Temp 97 °F (36.1 °C) (Tympanic)   Ht 154.9 cm (61\")   Wt 52.5 kg (115 lb 12.8 oz)   SpO2 99%   BMI 21.88 kg/m²    SpO2 " Percentage    07/29/25 1459   SpO2: 99%        BMI is within normal parameters. No other follow-up for BMI required.      Physical Exam  Constitutional:       Appearance: Normal appearance.   HENT:      Right Ear: External ear normal.      Left Ear: External ear normal.      Nose: Nose normal.      Mouth/Throat:      Mouth: Mucous membranes are moist.   Eyes:      Pupils: Pupils are equal, round, and reactive to light.   Cardiovascular:      Rate and Rhythm: Normal rate and regular rhythm.      Pulses: Normal pulses.   Pulmonary:      Effort: Pulmonary effort is normal.   Abdominal:      Palpations: Abdomen is soft.   Musculoskeletal:      Thoracic back: Tenderness present. Decreased range of motion.   Skin:     General: Skin is warm.   Neurological:      General: No focal deficit present.      Mental Status: She is alert and oriented to person, place, and time.   Psychiatric:         Mood and Affect: Mood normal.         Behavior: Behavior normal.          Fall Risk Assessment:  Fall Risk Assessment was completed, and patient is at low risk for falls.    Assessment & Plan  Patient Active Problem List   Diagnosis    Pulmonary nodules    Kidney stones    Hypothyroidism    Encounter for screening for malignant neoplasm of colon    HPV (human papilloma virus) infection    Mixed hyperlipidemia       ICD-10-CM ICD-9-CM   1. Annual physical exam  Z00.00 V70.0   2. Acquired hypothyroidism  E03.9 244.9   3. Allergy to alpha-gal  Z91.018 V15.05   4. Chronic right-sided thoracic back pain  M54.6 724.1    G89.29 338.29     Diagnoses and all orders for this visit:    1. Annual physical exam (Primary)    2. Acquired hypothyroidism  -     TSH  -     T4, Free  -     Lipid Panel  -     CBC & Differential  -     Comprehensive Metabolic Panel    3. Allergy to alpha-gal  -     EPINEPHrine (EPIPEN) 0.3 MG/0.3ML solution auto-injector injection; Inject 0.3 mL into the appropriate muscle as directed by prescriber 1 (One) Time for 1 dose.   Dispense: 1 each; Refill: 0  -     Alpha-Gal IgE Panel    4. Chronic right-sided thoracic back pain  -     Cancel: XR Spine Thoracic 2 View (In Office)  -     XR Spine Thoracic 3 View (In Office)       -- She presents today for her annual physical.   we will order lab work as listed above.  I will also send in  a new prescription for her EpiPen.  We will also get x-rays of her thoracic spine for further evaluation and treatment.  We also discussed screenings as well as work injury prevention including prevention at home.      Follow up in 6 months.            This document has been electronically signed by ROCKY Solomon  July 30, 2025 13:29 EDT    ROCKY Solomon  [address] [place] [address]  [phone] (office)    Part of this note may be an electronic transcription/translation of spoken language to printed text using the Dragon Dictation System.

## 2025-07-30 DIAGNOSIS — M54.2 CERVICAL PAIN (NECK): Primary | ICD-10-CM

## 2025-07-30 LAB
ALBUMIN SERPL-MCNC: 4.4 G/DL (ref 3.5–5.2)
ALBUMIN/GLOB SERPL: 1.6 G/DL
ALP SERPL-CCNC: 60 U/L (ref 39–117)
ALT SERPL W P-5'-P-CCNC: 11 U/L (ref 1–33)
ANION GAP SERPL CALCULATED.3IONS-SCNC: 10.3 MMOL/L (ref 5–15)
AST SERPL-CCNC: 15 U/L (ref 1–32)
BASOPHILS # BLD AUTO: 0.05 10*3/MM3 (ref 0–0.2)
BASOPHILS NFR BLD AUTO: 0.8 % (ref 0–1.5)
BILIRUB SERPL-MCNC: 0.4 MG/DL (ref 0–1.2)
BUN SERPL-MCNC: 9 MG/DL (ref 6–20)
BUN/CREAT SERPL: 14.3 (ref 7–25)
CALCIUM SPEC-SCNC: 9.2 MG/DL (ref 8.6–10.5)
CHLORIDE SERPL-SCNC: 104 MMOL/L (ref 98–107)
CHOLEST SERPL-MCNC: 177 MG/DL (ref 0–200)
CO2 SERPL-SCNC: 22.7 MMOL/L (ref 22–29)
CREAT SERPL-MCNC: 0.63 MG/DL (ref 0.57–1)
DEPRECATED RDW RBC AUTO: 42.2 FL (ref 37–54)
EGFRCR SERPLBLD CKD-EPI 2021: 110.3 ML/MIN/1.73
EOSINOPHIL # BLD AUTO: 0.11 10*3/MM3 (ref 0–0.4)
EOSINOPHIL NFR BLD AUTO: 1.8 % (ref 0.3–6.2)
ERYTHROCYTE [DISTWIDTH] IN BLOOD BY AUTOMATED COUNT: 11.7 % (ref 12.3–15.4)
GLOBULIN UR ELPH-MCNC: 2.7 GM/DL
GLUCOSE SERPL-MCNC: 78 MG/DL (ref 65–99)
HCT VFR BLD AUTO: 39.6 % (ref 34–46.6)
HDLC SERPL-MCNC: 60 MG/DL (ref 40–60)
HGB BLD-MCNC: 12.9 G/DL (ref 12–15.9)
IMM GRANULOCYTES # BLD AUTO: 0.04 10*3/MM3 (ref 0–0.05)
IMM GRANULOCYTES NFR BLD AUTO: 0.6 % (ref 0–0.5)
LDLC SERPL CALC-MCNC: 108 MG/DL (ref 0–100)
LDLC/HDLC SERPL: 1.8 {RATIO}
LYMPHOCYTES # BLD AUTO: 2.09 10*3/MM3 (ref 0.7–3.1)
LYMPHOCYTES NFR BLD AUTO: 33.3 % (ref 19.6–45.3)
MCH RBC QN AUTO: 31.9 PG (ref 26.6–33)
MCHC RBC AUTO-ENTMCNC: 32.6 G/DL (ref 31.5–35.7)
MCV RBC AUTO: 97.8 FL (ref 79–97)
MONOCYTES # BLD AUTO: 0.45 10*3/MM3 (ref 0.1–0.9)
MONOCYTES NFR BLD AUTO: 7.2 % (ref 5–12)
NEUTROPHILS NFR BLD AUTO: 3.53 10*3/MM3 (ref 1.7–7)
NEUTROPHILS NFR BLD AUTO: 56.3 % (ref 42.7–76)
NRBC BLD AUTO-RTO: 0 /100 WBC (ref 0–0.2)
PLATELET # BLD AUTO: 326 10*3/MM3 (ref 140–450)
PMV BLD AUTO: 10.7 FL (ref 6–12)
POTASSIUM SERPL-SCNC: 3.9 MMOL/L (ref 3.5–5.2)
PROT SERPL-MCNC: 7.1 G/DL (ref 6–8.5)
RBC # BLD AUTO: 4.05 10*6/MM3 (ref 3.77–5.28)
SODIUM SERPL-SCNC: 137 MMOL/L (ref 136–145)
T4 FREE SERPL-MCNC: 0.87 NG/DL (ref 0.92–1.68)
TRIGL SERPL-MCNC: 46 MG/DL (ref 0–150)
TSH SERPL DL<=0.05 MIU/L-ACNC: 1.53 UIU/ML (ref 0.27–4.2)
VLDLC SERPL-MCNC: 9 MG/DL (ref 5–40)
WBC NRBC COR # BLD AUTO: 6.27 10*3/MM3 (ref 3.4–10.8)

## 2025-08-04 ENCOUNTER — OFFICE VISIT (OUTPATIENT)
Dept: FAMILY MEDICINE CLINIC | Facility: CLINIC | Age: 50
End: 2025-08-04
Payer: COMMERCIAL

## 2025-08-04 VITALS
HEART RATE: 75 BPM | DIASTOLIC BLOOD PRESSURE: 82 MMHG | HEIGHT: 61 IN | WEIGHT: 114.4 LBS | SYSTOLIC BLOOD PRESSURE: 108 MMHG | OXYGEN SATURATION: 99 % | TEMPERATURE: 98.4 F | BODY MASS INDEX: 21.6 KG/M2

## 2025-08-04 DIAGNOSIS — R53.83 FATIGUE, UNSPECIFIED TYPE: Primary | ICD-10-CM

## 2025-08-04 DIAGNOSIS — G89.29 CHRONIC RIGHT-SIDED THORACIC BACK PAIN: ICD-10-CM

## 2025-08-04 DIAGNOSIS — M54.2 CERVICAL PAIN (NECK): ICD-10-CM

## 2025-08-04 DIAGNOSIS — M54.6 CHRONIC RIGHT-SIDED THORACIC BACK PAIN: ICD-10-CM

## 2025-08-04 LAB
ALPHA-GAL IGE QN: 3.1 KU/L
BEEF IGE QN: 1.76 KU/L
IGE SERPL-ACNC: 73 IU/ML (ref 6–495)
LAMB IGE QN: 0.99 KU/L
PORK IGE QN: 0.48 KU/L
SERVICE CMNT-IMP: ABNORMAL

## 2025-08-04 PROCEDURE — 1125F AMNT PAIN NOTED PAIN PRSNT: CPT | Performed by: NURSE PRACTITIONER

## 2025-08-04 PROCEDURE — 82746 ASSAY OF FOLIC ACID SERUM: CPT | Performed by: NURSE PRACTITIONER

## 2025-08-04 PROCEDURE — 84466 ASSAY OF TRANSFERRIN: CPT | Performed by: NURSE PRACTITIONER

## 2025-08-04 PROCEDURE — 83540 ASSAY OF IRON: CPT | Performed by: NURSE PRACTITIONER

## 2025-08-04 PROCEDURE — 82306 VITAMIN D 25 HYDROXY: CPT | Performed by: NURSE PRACTITIONER

## 2025-08-04 PROCEDURE — 82607 VITAMIN B-12: CPT | Performed by: NURSE PRACTITIONER

## 2025-08-04 PROCEDURE — 99214 OFFICE O/P EST MOD 30 MIN: CPT | Performed by: NURSE PRACTITIONER

## 2025-08-04 RX ORDER — EPINEPHRINE 0.3 MG/.3ML
0.3 INJECTION SUBCUTANEOUS ONCE
COMMUNITY
Start: 2025-07-29

## 2025-08-05 ENCOUNTER — PATIENT MESSAGE (OUTPATIENT)
Dept: FAMILY MEDICINE CLINIC | Facility: CLINIC | Age: 50
End: 2025-08-05
Payer: COMMERCIAL

## 2025-08-05 LAB
25(OH)D3 SERPL-MCNC: 33.1 NG/ML (ref 30–100)
FOLATE SERPL-MCNC: 16.2 NG/ML (ref 4.78–24.2)
IRON 24H UR-MRATE: 77 MCG/DL (ref 37–145)
IRON SATN MFR SERPL: 45 % (ref 20–50)
TIBC SERPL-MCNC: 173 MCG/DL (ref 298–536)
TRANSFERRIN SERPL-MCNC: 116 MG/DL (ref 200–360)
VIT B12 BLD-MCNC: 597 PG/ML (ref 211–946)

## 2025-08-05 RX ORDER — FERROUS SULFATE 325(65) MG
325 TABLET ORAL
Qty: 30 TABLET | Refills: 2 | Status: SHIPPED | OUTPATIENT
Start: 2025-08-05

## 2025-08-19 DIAGNOSIS — M54.2 CERVICAL PAIN (NECK): Primary | ICD-10-CM

## 2025-08-25 RX ORDER — HYDROXYZINE HYDROCHLORIDE 25 MG/1
25 TABLET, FILM COATED ORAL 3 TIMES DAILY PRN
Qty: 90 TABLET | Refills: 0 | Status: SHIPPED | OUTPATIENT
Start: 2025-08-25

## (undated) DEVICE — Device

## (undated) DEVICE — Device: Brand: DEFENDO AIR/WATER/SUCTION AND BIOPSY VALVE

## (undated) DEVICE — ENDOGATOR TUBING FOR ENDOGATOR EGP-100 IRRIGATION PUMP,OLYMPUS OFP PUMP, OLYMPUS AFU-100 PUMP AND ERBE EIP2 PUMP: Brand: ENDOGATOR

## (undated) DEVICE — ENDOGATOR AUXILIARY WATER JET CONNECTOR: Brand: ENDOGATOR

## (undated) DEVICE — SINGLE PORT MANIFOLD: Brand: NEPTUNE 2

## (undated) DEVICE — CONN Y IRR DISP 1P/U

## (undated) DEVICE — TUBING, SUCTION, 1/4" X 20', STRAIGHT: Brand: MEDLINE INDUSTRIES, INC.